# Patient Record
Sex: FEMALE | Race: OTHER | NOT HISPANIC OR LATINO | URBAN - METROPOLITAN AREA
[De-identification: names, ages, dates, MRNs, and addresses within clinical notes are randomized per-mention and may not be internally consistent; named-entity substitution may affect disease eponyms.]

---

## 2017-01-15 ENCOUNTER — EMERGENCY (EMERGENCY)
Facility: HOSPITAL | Age: 32
LOS: 1 days | Discharge: HOME | End: 2017-01-15
Attending: EMERGENCY MEDICINE | Admitting: EMERGENCY MEDICINE
Payer: COMMERCIAL

## 2017-01-15 VITALS
HEART RATE: 88 BPM | OXYGEN SATURATION: 96 % | DIASTOLIC BLOOD PRESSURE: 67 MMHG | RESPIRATION RATE: 18 BRPM | SYSTOLIC BLOOD PRESSURE: 107 MMHG | TEMPERATURE: 99 F

## 2017-01-15 VITALS
RESPIRATION RATE: 18 BRPM | TEMPERATURE: 98 F | DIASTOLIC BLOOD PRESSURE: 84 MMHG | OXYGEN SATURATION: 100 % | HEART RATE: 68 BPM | SYSTOLIC BLOOD PRESSURE: 126 MMHG

## 2017-01-15 DIAGNOSIS — N80.3 ENDOMETRIOSIS OF PELVIC PERITONEUM: Chronic | ICD-10-CM

## 2017-01-15 LAB
ALBUMIN SERPL ELPH-MCNC: 4.6 G/DL — SIGNIFICANT CHANGE UP (ref 3.3–5)
ALP SERPL-CCNC: 60 U/L — SIGNIFICANT CHANGE UP (ref 40–120)
ALT FLD-CCNC: 16 U/L RC — SIGNIFICANT CHANGE UP (ref 10–45)
ANION GAP SERPL CALC-SCNC: 15 MMOL/L — SIGNIFICANT CHANGE UP (ref 5–17)
APPEARANCE UR: ABNORMAL
AST SERPL-CCNC: 38 U/L — SIGNIFICANT CHANGE UP (ref 10–40)
BASOPHILS # BLD AUTO: 0 K/UL — SIGNIFICANT CHANGE UP (ref 0–0.2)
BASOPHILS NFR BLD AUTO: 0.8 % — SIGNIFICANT CHANGE UP (ref 0–2)
BILIRUB SERPL-MCNC: 1.5 MG/DL — HIGH (ref 0.2–1.2)
BILIRUB UR-MCNC: NEGATIVE — SIGNIFICANT CHANGE UP
BUN SERPL-MCNC: 8 MG/DL — SIGNIFICANT CHANGE UP (ref 7–23)
CALCIUM SERPL-MCNC: 9.4 MG/DL — SIGNIFICANT CHANGE UP (ref 8.4–10.5)
CHLORIDE SERPL-SCNC: 104 MMOL/L — SIGNIFICANT CHANGE UP (ref 96–108)
CO2 SERPL-SCNC: 26 MMOL/L — SIGNIFICANT CHANGE UP (ref 22–31)
COLOR SPEC: YELLOW — SIGNIFICANT CHANGE UP
CREAT SERPL-MCNC: 0.7 MG/DL — SIGNIFICANT CHANGE UP (ref 0.5–1.3)
DIFF PNL FLD: ABNORMAL
EOSINOPHIL # BLD AUTO: 0.1 K/UL — SIGNIFICANT CHANGE UP (ref 0–0.5)
EOSINOPHIL NFR BLD AUTO: 1.5 % — SIGNIFICANT CHANGE UP (ref 0–6)
EPI CELLS # UR: SIGNIFICANT CHANGE UP /HPF
GLUCOSE SERPL-MCNC: 95 MG/DL — SIGNIFICANT CHANGE UP (ref 70–99)
GLUCOSE UR QL: NEGATIVE — SIGNIFICANT CHANGE UP
HCG SERPL-ACNC: <2 MIU/ML — SIGNIFICANT CHANGE UP
HCT VFR BLD CALC: 43.8 % — SIGNIFICANT CHANGE UP (ref 34.5–45)
HGB BLD-MCNC: 14.7 G/DL — SIGNIFICANT CHANGE UP (ref 11.5–15.5)
KETONES UR-MCNC: NEGATIVE — SIGNIFICANT CHANGE UP
LEUKOCYTE ESTERASE UR-ACNC: NEGATIVE — SIGNIFICANT CHANGE UP
LYMPHOCYTES # BLD AUTO: 3.4 K/UL — HIGH (ref 1–3.3)
LYMPHOCYTES # BLD AUTO: 56.3 % — HIGH (ref 13–44)
MCHC RBC-ENTMCNC: 32.5 PG — SIGNIFICANT CHANGE UP (ref 27–34)
MCHC RBC-ENTMCNC: 33.5 GM/DL — SIGNIFICANT CHANGE UP (ref 32–36)
MCV RBC AUTO: 96.9 FL — SIGNIFICANT CHANGE UP (ref 80–100)
MONOCYTES # BLD AUTO: 0.6 K/UL — SIGNIFICANT CHANGE UP (ref 0–0.9)
MONOCYTES NFR BLD AUTO: 10 % — SIGNIFICANT CHANGE UP (ref 2–14)
NEUTROPHILS # BLD AUTO: 1.9 K/UL — SIGNIFICANT CHANGE UP (ref 1.8–7.4)
NEUTROPHILS NFR BLD AUTO: 31.3 % — LOW (ref 43–77)
NITRITE UR-MCNC: NEGATIVE — SIGNIFICANT CHANGE UP
PH UR: 7 — SIGNIFICANT CHANGE UP (ref 4.8–8)
PLATELET # BLD AUTO: 278 K/UL — SIGNIFICANT CHANGE UP (ref 150–400)
POTASSIUM SERPL-MCNC: 4.3 MMOL/L — SIGNIFICANT CHANGE UP (ref 3.5–5.3)
POTASSIUM SERPL-SCNC: 4.3 MMOL/L — SIGNIFICANT CHANGE UP (ref 3.5–5.3)
PROT SERPL-MCNC: 8 G/DL — SIGNIFICANT CHANGE UP (ref 6–8.3)
PROT UR-MCNC: 30 MG/DL
RBC # BLD: 4.51 M/UL — SIGNIFICANT CHANGE UP (ref 3.8–5.2)
RBC # FLD: 12.3 % — SIGNIFICANT CHANGE UP (ref 10.3–14.5)
SODIUM SERPL-SCNC: 145 MMOL/L — SIGNIFICANT CHANGE UP (ref 135–145)
SP GR SPEC: 1.02 — SIGNIFICANT CHANGE UP (ref 1.01–1.02)
UROBILINOGEN FLD QL: NEGATIVE — SIGNIFICANT CHANGE UP
WBC # BLD: 6 K/UL — SIGNIFICANT CHANGE UP (ref 3.8–10.5)
WBC # FLD AUTO: 6 K/UL — SIGNIFICANT CHANGE UP (ref 3.8–10.5)
WBC UR QL: SIGNIFICANT CHANGE UP /HPF (ref 0–5)

## 2017-01-15 PROCEDURE — 76856 US EXAM PELVIC COMPLETE: CPT | Mod: 26,59

## 2017-01-15 PROCEDURE — 76830 TRANSVAGINAL US NON-OB: CPT | Mod: 26

## 2017-01-15 PROCEDURE — 99285 EMERGENCY DEPT VISIT HI MDM: CPT | Mod: 25

## 2017-01-15 PROCEDURE — 93975 VASCULAR STUDY: CPT | Mod: 26

## 2017-01-15 RX ORDER — METOCLOPRAMIDE HCL 10 MG
10 TABLET ORAL ONCE
Qty: 0 | Refills: 0 | Status: COMPLETED | OUTPATIENT
Start: 2017-01-15 | End: 2017-01-15

## 2017-01-15 RX ORDER — IBUPROFEN 200 MG
1 TABLET ORAL
Qty: 60 | Refills: 0 | OUTPATIENT
Start: 2017-01-15 | End: 2017-01-30

## 2017-01-15 RX ORDER — OXYCODONE HYDROCHLORIDE 5 MG/1
1 TABLET ORAL
Qty: 4 | Refills: 0 | OUTPATIENT
Start: 2017-01-15 | End: 2017-01-16

## 2017-01-15 RX ORDER — MORPHINE SULFATE 50 MG/1
4 CAPSULE, EXTENDED RELEASE ORAL ONCE
Qty: 0 | Refills: 0 | Status: DISCONTINUED | OUTPATIENT
Start: 2017-01-15 | End: 2017-01-15

## 2017-01-15 RX ORDER — DIPHENHYDRAMINE HCL 50 MG
50 CAPSULE ORAL ONCE
Qty: 0 | Refills: 0 | Status: COMPLETED | OUTPATIENT
Start: 2017-01-15 | End: 2017-01-15

## 2017-01-15 RX ORDER — IBUPROFEN 200 MG
1 TABLET ORAL
Qty: 28 | Refills: 0 | OUTPATIENT
Start: 2017-01-15 | End: 2017-01-22

## 2017-01-15 RX ORDER — ONDANSETRON 8 MG/1
4 TABLET, FILM COATED ORAL ONCE
Qty: 0 | Refills: 0 | Status: COMPLETED | OUTPATIENT
Start: 2017-01-15 | End: 2017-01-15

## 2017-01-15 RX ADMIN — Medication 10 MILLIGRAM(S): at 08:16

## 2017-01-15 RX ADMIN — Medication 50 MILLIGRAM(S): at 07:18

## 2017-01-15 RX ADMIN — MORPHINE SULFATE 4 MILLIGRAM(S): 50 CAPSULE, EXTENDED RELEASE ORAL at 08:45

## 2017-01-15 RX ADMIN — MORPHINE SULFATE 4 MILLIGRAM(S): 50 CAPSULE, EXTENDED RELEASE ORAL at 08:17

## 2017-01-15 NOTE — ED PROVIDER NOTE - GENITOURINARY EXTERNAL GENERAL. FEMALE
no adnexal tenderness or fullness. no cmt. no rashes/normal normal/no adnexal tenderness or fullness. no cmt. no rashes small blood in vault

## 2017-01-15 NOTE — ED PROVIDER NOTE - MEDICAL DECISION MAKING DETAILS
31f pmhx endometriosis s/p lap fulguration p/w abdo pain. suprapubic, started last night, gradual onset, now severe, constant, associated with nausea without vomiting. LMP 4 months ago, denies possibility of pregnancy. on PE, VSS, in moderate/severe distress, pain IMPROVED on palpation. will obtains basics, TVUS, UA, reassess

## 2017-01-15 NOTE — ED PROVIDER NOTE - CARE PLAN
Principal Discharge DX:	Abdominal pain Principal Discharge DX:	Abdominal pain  Instructions for follow-up, activity and diet:	1. return for worsening symptoms or anything concerning to you  2. take all home meds as prescribed  3. follow up with your pmd call to make an appointment  4. follow up with your obgyn call to make an appointment  5. Take motrin 600mg PO Q6 hours prn pain  6. Take oxycodone 5 mg every 6 hours as needed for pain; do not drink alcohol while taking this medication.

## 2017-01-15 NOTE — ED PROVIDER NOTE - PLAN OF CARE
1. return for worsening symptoms or anything concerning to you  2. take all home meds as prescribed  3. follow up with your pmd call to make an appointment  4. follow up with your obgyn call to make an appointment  5. Take motrin 600mg PO Q6 hours prn pain  6. Take oxycodone 5 mg every 6 hours as needed for pain; do not drink alcohol while taking this medication.

## 2017-01-15 NOTE — ED PROVIDER NOTE - OBJECTIVE STATEMENT
31F hx endometriosis presents with severe sudden onset pelvic pain. Pain woke her up from sleep. severe suprapubic/pelvic non-radiating. associated with nausea no vomiting. no vaginal discharge or bleeding. LMP 3 months ago - pt on depot provera. hasn't taken anything for the pain. no dysuria

## 2017-01-15 NOTE — ED ADULT NURSE NOTE - OBJECTIVE STATEMENT
32 y/o female BIBA for multiple episodes of N/V for past two hours. Pt c/o lower mid gastric ABD pain. Denies diarrhea, fever, chills, SOB, chest pain. A&Ox4, VSS, skin warm dry and intact, lungs CTA, MAEx4. Awaits further evaluation from MD.

## 2017-01-18 DIAGNOSIS — Z88.8 ALLERGY STATUS TO OTHER DRUGS, MEDICAMENTS AND BIOLOGICAL SUBSTANCES STATUS: ICD-10-CM

## 2017-01-18 DIAGNOSIS — R10.9 UNSPECIFIED ABDOMINAL PAIN: ICD-10-CM

## 2017-04-13 PROCEDURE — 99284 EMERGENCY DEPT VISIT MOD MDM: CPT | Mod: 25

## 2017-04-13 PROCEDURE — 84702 CHORIONIC GONADOTROPIN TEST: CPT

## 2017-04-13 PROCEDURE — 76856 US EXAM PELVIC COMPLETE: CPT

## 2017-04-13 PROCEDURE — 96374 THER/PROPH/DIAG INJ IV PUSH: CPT

## 2017-04-13 PROCEDURE — 93975 VASCULAR STUDY: CPT

## 2017-04-13 PROCEDURE — 80053 COMPREHEN METABOLIC PANEL: CPT

## 2017-04-13 PROCEDURE — 76830 TRANSVAGINAL US NON-OB: CPT

## 2017-04-13 PROCEDURE — 96375 TX/PRO/DX INJ NEW DRUG ADDON: CPT

## 2017-04-13 PROCEDURE — 81001 URINALYSIS AUTO W/SCOPE: CPT

## 2017-04-13 PROCEDURE — 85027 COMPLETE CBC AUTOMATED: CPT

## 2018-07-23 ENCOUNTER — EMERGENCY (EMERGENCY)
Facility: HOSPITAL | Age: 33
LOS: 1 days | Discharge: HOME | End: 2018-07-23
Attending: EMERGENCY MEDICINE
Payer: COMMERCIAL

## 2018-07-23 VITALS
HEIGHT: 65 IN | OXYGEN SATURATION: 97 % | RESPIRATION RATE: 20 BRPM | DIASTOLIC BLOOD PRESSURE: 87 MMHG | SYSTOLIC BLOOD PRESSURE: 133 MMHG | HEART RATE: 90 BPM | WEIGHT: 160.06 LBS | TEMPERATURE: 98 F

## 2018-07-23 DIAGNOSIS — N80.3 ENDOMETRIOSIS OF PELVIC PERITONEUM: Chronic | ICD-10-CM

## 2018-07-23 LAB
ALBUMIN SERPL ELPH-MCNC: 4.1 G/DL — SIGNIFICANT CHANGE UP (ref 3.5–5)
ALP SERPL-CCNC: 73 U/L — SIGNIFICANT CHANGE UP (ref 40–120)
ALT FLD-CCNC: 17 U/L DA — SIGNIFICANT CHANGE UP (ref 10–60)
ANION GAP SERPL CALC-SCNC: 7 MMOL/L — SIGNIFICANT CHANGE UP (ref 5–17)
APPEARANCE UR: SIGNIFICANT CHANGE UP
AST SERPL-CCNC: 18 U/L — SIGNIFICANT CHANGE UP (ref 10–40)
BASOPHILS # BLD AUTO: 0.1 K/UL — SIGNIFICANT CHANGE UP (ref 0–0.2)
BASOPHILS NFR BLD AUTO: 2.1 % — HIGH (ref 0–2)
BILIRUB SERPL-MCNC: 0.9 MG/DL — SIGNIFICANT CHANGE UP (ref 0.2–1.2)
BILIRUB UR-MCNC: NEGATIVE — SIGNIFICANT CHANGE UP
BUN SERPL-MCNC: 7 MG/DL — SIGNIFICANT CHANGE UP (ref 7–18)
CALCIUM SERPL-MCNC: 8.6 MG/DL — SIGNIFICANT CHANGE UP (ref 8.4–10.5)
CHLORIDE SERPL-SCNC: 109 MMOL/L — HIGH (ref 96–108)
CO2 SERPL-SCNC: 23 MMOL/L — SIGNIFICANT CHANGE UP (ref 22–31)
COLOR SPEC: YELLOW — SIGNIFICANT CHANGE UP
CREAT SERPL-MCNC: 0.8 MG/DL — SIGNIFICANT CHANGE UP (ref 0.5–1.3)
DIFF PNL FLD: ABNORMAL
EOSINOPHIL # BLD AUTO: 0.1 K/UL — SIGNIFICANT CHANGE UP (ref 0–0.5)
EOSINOPHIL NFR BLD AUTO: 1.8 % — SIGNIFICANT CHANGE UP (ref 0–6)
GLUCOSE SERPL-MCNC: 82 MG/DL — SIGNIFICANT CHANGE UP (ref 70–99)
GLUCOSE UR QL: NEGATIVE — SIGNIFICANT CHANGE UP
HCG SERPL-ACNC: <1 MIU/ML — SIGNIFICANT CHANGE UP
HCG UR QL: NEGATIVE — SIGNIFICANT CHANGE UP
HCT VFR BLD CALC: 41.4 % — SIGNIFICANT CHANGE UP (ref 34.5–45)
HGB BLD-MCNC: 13.7 G/DL — SIGNIFICANT CHANGE UP (ref 11.5–15.5)
KETONES UR-MCNC: ABNORMAL
LEUKOCYTE ESTERASE UR-ACNC: ABNORMAL
LIDOCAIN IGE QN: 88 U/L — SIGNIFICANT CHANGE UP (ref 73–393)
LYMPHOCYTES # BLD AUTO: 1.8 K/UL — SIGNIFICANT CHANGE UP (ref 1–3.3)
LYMPHOCYTES # BLD AUTO: 42.3 % — SIGNIFICANT CHANGE UP (ref 13–44)
MCHC RBC-ENTMCNC: 31.6 PG — SIGNIFICANT CHANGE UP (ref 27–34)
MCHC RBC-ENTMCNC: 33.2 GM/DL — SIGNIFICANT CHANGE UP (ref 32–36)
MCV RBC AUTO: 95.1 FL — SIGNIFICANT CHANGE UP (ref 80–100)
MONOCYTES # BLD AUTO: 0.3 K/UL — SIGNIFICANT CHANGE UP (ref 0–0.9)
MONOCYTES NFR BLD AUTO: 8 % — SIGNIFICANT CHANGE UP (ref 2–14)
NEUTROPHILS # BLD AUTO: 2 K/UL — SIGNIFICANT CHANGE UP (ref 1.8–7.4)
NEUTROPHILS NFR BLD AUTO: 45.8 % — SIGNIFICANT CHANGE UP (ref 43–77)
NITRITE UR-MCNC: NEGATIVE — SIGNIFICANT CHANGE UP
PH UR: 6 — SIGNIFICANT CHANGE UP (ref 5–8)
PLATELET # BLD AUTO: 332 K/UL — SIGNIFICANT CHANGE UP (ref 150–400)
POTASSIUM SERPL-MCNC: 3.9 MMOL/L — SIGNIFICANT CHANGE UP (ref 3.5–5.3)
POTASSIUM SERPL-SCNC: 3.9 MMOL/L — SIGNIFICANT CHANGE UP (ref 3.5–5.3)
PROT SERPL-MCNC: 8 G/DL — SIGNIFICANT CHANGE UP (ref 6–8.3)
PROT UR-MCNC: 30 MG/DL
RBC # BLD: 4.36 M/UL — SIGNIFICANT CHANGE UP (ref 3.8–5.2)
RBC # FLD: 12.4 % — SIGNIFICANT CHANGE UP (ref 10.3–14.5)
SODIUM SERPL-SCNC: 139 MMOL/L — SIGNIFICANT CHANGE UP (ref 135–145)
SP GR SPEC: 1.01 — SIGNIFICANT CHANGE UP (ref 1.01–1.02)
UROBILINOGEN FLD QL: NEGATIVE — SIGNIFICANT CHANGE UP
WBC # BLD: 4.4 K/UL — SIGNIFICANT CHANGE UP (ref 3.8–10.5)
WBC # FLD AUTO: 4.4 K/UL — SIGNIFICANT CHANGE UP (ref 3.8–10.5)

## 2018-07-23 PROCEDURE — 99285 EMERGENCY DEPT VISIT HI MDM: CPT

## 2018-07-23 PROCEDURE — 76830 TRANSVAGINAL US NON-OB: CPT | Mod: 26

## 2018-07-23 PROCEDURE — 76856 US EXAM PELVIC COMPLETE: CPT | Mod: 26

## 2018-07-23 PROCEDURE — 74177 CT ABD & PELVIS W/CONTRAST: CPT | Mod: 26

## 2018-07-23 RX ORDER — MORPHINE SULFATE 50 MG/1
4 CAPSULE, EXTENDED RELEASE ORAL ONCE
Qty: 0 | Refills: 0 | Status: DISCONTINUED | OUTPATIENT
Start: 2018-07-23 | End: 2018-07-23

## 2018-07-23 RX ORDER — SODIUM CHLORIDE 9 MG/ML
1000 INJECTION INTRAMUSCULAR; INTRAVENOUS; SUBCUTANEOUS ONCE
Qty: 0 | Refills: 0 | Status: COMPLETED | OUTPATIENT
Start: 2018-07-23 | End: 2018-07-23

## 2018-07-23 RX ORDER — HYDROMORPHONE HYDROCHLORIDE 2 MG/ML
1 INJECTION INTRAMUSCULAR; INTRAVENOUS; SUBCUTANEOUS ONCE
Qty: 0 | Refills: 0 | Status: DISCONTINUED | OUTPATIENT
Start: 2018-07-23 | End: 2018-07-23

## 2018-07-23 RX ORDER — METOCLOPRAMIDE HCL 10 MG
10 TABLET ORAL ONCE
Qty: 0 | Refills: 0 | Status: COMPLETED | OUTPATIENT
Start: 2018-07-23 | End: 2018-07-23

## 2018-07-23 RX ORDER — HYDROMORPHONE HYDROCHLORIDE 2 MG/ML
0.5 INJECTION INTRAMUSCULAR; INTRAVENOUS; SUBCUTANEOUS ONCE
Qty: 0 | Refills: 0 | Status: DISCONTINUED | OUTPATIENT
Start: 2018-07-23 | End: 2018-07-23

## 2018-07-23 RX ADMIN — SODIUM CHLORIDE 1000 MILLILITER(S): 9 INJECTION INTRAMUSCULAR; INTRAVENOUS; SUBCUTANEOUS at 20:42

## 2018-07-23 RX ADMIN — MORPHINE SULFATE 4 MILLIGRAM(S): 50 CAPSULE, EXTENDED RELEASE ORAL at 20:42

## 2018-07-23 RX ADMIN — HYDROMORPHONE HYDROCHLORIDE 1 MILLIGRAM(S): 2 INJECTION INTRAMUSCULAR; INTRAVENOUS; SUBCUTANEOUS at 21:36

## 2018-07-23 RX ADMIN — HYDROMORPHONE HYDROCHLORIDE 0.5 MILLIGRAM(S): 2 INJECTION INTRAMUSCULAR; INTRAVENOUS; SUBCUTANEOUS at 22:29

## 2018-07-23 RX ADMIN — MORPHINE SULFATE 4 MILLIGRAM(S): 50 CAPSULE, EXTENDED RELEASE ORAL at 21:40

## 2018-07-23 RX ADMIN — Medication 10 MILLIGRAM(S): at 20:42

## 2018-07-23 RX ADMIN — HYDROMORPHONE HYDROCHLORIDE 1 MILLIGRAM(S): 2 INJECTION INTRAMUSCULAR; INTRAVENOUS; SUBCUTANEOUS at 22:30

## 2018-07-23 NOTE — ED PROVIDER NOTE - OBJECTIVE STATEMENT
34 y/o F pt w/ PMHx of endometrial cyst presents to ED c/o abdominal pain, vaginal bleeding, nausea, vomiting x 2 days. Pt denies diarrhea, urinary symptoms, and all other complaints. Pt reports LMP ended 10 days ago. NKDA. 34 y/o F pt w/ PMHx of endometriosis presents to ED c/o abdominal pain, vaginal bleeding, nausea, vomiting x 2 days. Pt denies diarrhea, urinary symptoms, and all other complaints. Pt reports LMP ended 10 days ago. NKDA.

## 2018-07-23 NOTE — ED PROVIDER NOTE - PROGRESS NOTE DETAILS
Pt still uncomfortable.  Says to uncomfortable to go to ultrasound.  Will give dose of dilaudid and reassess. Patient signed out to me by Dr. Forman. c/o severe abdominal pain. Feels moderately better now. Awaiting results of US and CT abdomen/pelvis. If negative, may d/c. US and CT normal. Patient still c/o severe pain. Will call gyn to evaluate patient. Patient seen by gyn TRIPP Cenetno, who elicited additional information. Patient sees a pain management specialist, who does spinal blocks and prescribes percocet 30mg and gabapentin. Patient has also been having vomiting and diarrhea for 3days, non-bloody, non-bilious. No fever, cp, sob, dysuria, urgency, frequency. Patient has not been taking her pain medicine and has not gotten her Lupron shots in 1 year. Will refer patient back to her pain specialist and give information to cc to arrange f/u with GI. Patient also instructed to f/u with gyn in 1-2 days for reeval to and restart Lupron. Patient seen by gyn TRIPP Centeno, who elicited additional information. Patient sees a pain management specialist, who does spinal blocks and prescribes percocet #30/month and gabapentin. Patient has also been having vomiting and diarrhea for 3days, non-bloody, non-bilious. No fever, cp, sob, dysuria, urgency, frequency. Patient has not been taking her pain medicine and has not gotten her Lupron shots in 1 year. Will refer patient back to her pain specialist and give information to cc to arrange f/u with GI. Patient also instructed to f/u with gyn in 1-2 days for reeval to and restart Lupron. Patient reports she ran out of percocet. Patient reports she always runs out early. I warned patient that if I prescribe her percocet, her pain management doctor may dismiss her from the practice. Patient still wants rx. Patient resting comfortably, feels moderately improved. Drank gatorade. No vomiting. Patient discharged home in company of friend.

## 2018-07-23 NOTE — ED PROVIDER NOTE - CARE PLAN
Principal Discharge DX:	Generalized abdominal pain  Secondary Diagnosis:	Non-intractable vomiting with nausea, unspecified vomiting type  Secondary Diagnosis:	Diarrhea, unspecified type

## 2018-07-23 NOTE — ED PROVIDER NOTE - MEDICAL DECISION MAKING DETAILS
32 y/o F pt history of endometritis, presents diffuse lower abdomen pain, pain different from endometritis, will conduct labs, US, CAT scan, analgesia, reasses.

## 2018-07-24 VITALS
OXYGEN SATURATION: 100 % | RESPIRATION RATE: 16 BRPM | TEMPERATURE: 99 F | HEART RATE: 81 BPM | DIASTOLIC BLOOD PRESSURE: 76 MMHG | SYSTOLIC BLOOD PRESSURE: 130 MMHG

## 2018-07-24 PROCEDURE — 96374 THER/PROPH/DIAG INJ IV PUSH: CPT

## 2018-07-24 PROCEDURE — 76856 US EXAM PELVIC COMPLETE: CPT

## 2018-07-24 PROCEDURE — 81001 URINALYSIS AUTO W/SCOPE: CPT

## 2018-07-24 PROCEDURE — 85027 COMPLETE CBC AUTOMATED: CPT

## 2018-07-24 PROCEDURE — 80053 COMPREHEN METABOLIC PANEL: CPT

## 2018-07-24 PROCEDURE — 76830 TRANSVAGINAL US NON-OB: CPT

## 2018-07-24 PROCEDURE — 96376 TX/PRO/DX INJ SAME DRUG ADON: CPT

## 2018-07-24 PROCEDURE — 81025 URINE PREGNANCY TEST: CPT

## 2018-07-24 PROCEDURE — 74177 CT ABD & PELVIS W/CONTRAST: CPT

## 2018-07-24 PROCEDURE — 83690 ASSAY OF LIPASE: CPT

## 2018-07-24 PROCEDURE — 99284 EMERGENCY DEPT VISIT MOD MDM: CPT | Mod: 25

## 2018-07-24 PROCEDURE — 84702 CHORIONIC GONADOTROPIN TEST: CPT

## 2018-07-24 PROCEDURE — 96375 TX/PRO/DX INJ NEW DRUG ADDON: CPT

## 2018-07-24 RX ORDER — OXYCODONE HYDROCHLORIDE 5 MG/1
1 TABLET ORAL
Qty: 8 | Refills: 0 | OUTPATIENT
Start: 2018-07-24

## 2018-07-24 RX ORDER — ONDANSETRON 8 MG/1
1 TABLET, FILM COATED ORAL
Qty: 9 | Refills: 0 | OUTPATIENT
Start: 2018-07-24 | End: 2018-07-26

## 2018-07-24 RX ADMIN — HYDROMORPHONE HYDROCHLORIDE 1 MILLIGRAM(S): 2 INJECTION INTRAMUSCULAR; INTRAVENOUS; SUBCUTANEOUS at 01:12

## 2018-07-24 RX ADMIN — HYDROMORPHONE HYDROCHLORIDE 0.5 MILLIGRAM(S): 2 INJECTION INTRAMUSCULAR; INTRAVENOUS; SUBCUTANEOUS at 00:45

## 2018-07-24 RX ADMIN — HYDROMORPHONE HYDROCHLORIDE 1 MILLIGRAM(S): 2 INJECTION INTRAMUSCULAR; INTRAVENOUS; SUBCUTANEOUS at 00:48

## 2018-07-24 NOTE — CONSULT NOTE ADULT - SUBJECTIVE AND OBJECTIVE BOX
Patient is a 33 year old G0 who presented to the ER with complaint of nausea, vomiting, diarrhea and sharp pulling upper abdominal pain since Saturday.  Reports a long history of endometriosis for which she follows Dr Hamm and was given lupron.  Patient states that she has not taken lupron for over a year.  Patient also is followed by a pain management specialist who gives her gabapentin, oxycodone and nerve blocks monthly.  States this pain is different from her usual endometriosis pain, as it is higher in her abdomen and different in nature.  Has not taken anything for pain at home.  Denies fevers, chills, heavy vaginal bleeding, recent intercourse, or any other concerns.     PMHx: Denies  Sxhx: 6 laparoscopic procedures for endometriosis, left oophorectomy, appendectomy, bilateral hernia repair  Meds: None currently  Allergies: Zofran - anxiety/palpitations  GynHx: Known endometriosis, no fibroids, no cysts, no stds, normal paps  OBHx: Nulliparous    Labs:                         13.7   4.4   )-----------( 332      ( 23 Jul 2018 20:40 )             41.4   07-23    139  |  109<H>  |  7   ----------------------------<  82  3.9   |  23  |  0.80    Ca    8.6      23 Jul 2018 20:40    TPro  8.0  /  Alb  4.1  /  TBili  0.9  /  DBili  x   /  AST  18  /  ALT  17  /  AlkPhos  73  07-23    Sono:   < from: US Transvaginal (07.23.18 @ 22:07) >  FINDINGS:  Uterus: Unremarkable. Endometrial stripe is normal for age.    Ovaries:   The right ovary measures 2.4 x 1.4 x 1.9 cm. Normal vascularity is   present. No adnexal masses.    The left ovary measures 2.9 x 1.7 x 1.0 cm. Normal vascularity is   present. No adnexal masses.    There is no free fluid.    IMPRESSION:  Unremarkable pelvic sonogram.    CT Abdomen/Pelvis:  < from: CT Abdomen and Pelvis w/ IV Cont (07.23.18 @ 22:36) >  FINDINGS:  Lung bases: Clear.    Organs: The liver, gallbladder, spleen, pancreas, kidneys and adrenal   glands are unremarkable.    Gastrointestinal tract: There is no evidence of a bowel obstruction or   inflammation. The patient is status post an appendectomy.    Vascular: There is no abdominal aortic aneurysm. A retroaortic left renal   vein is incidentally noted.    Pelvis:The urinary bladder, uterus and right ovary are unremarkable. The   left ovary is not visualized. No left adnexal mass is present.    Miscellaneous: There is no significant abdominal or pelvic   lymphadenopathy. No free air or free fluid is demonstrated.    Bones: The visualized osseous structures are unremarkable.    IMPRESSION:  No bowel obstruction or inflammation. Status post appendectomy.    < end of copied text >    A/P: 33 year old female with known history of endometriosis.  Today with different type of abdominal pain, likely unrelated to endometriosis.      -Supportive management for gastritis symptoms  -Pain management   -Follow up outpatient with gyn and pain management specialist  -No immediate gyn intervention needed    Discussed with Dr Diaz

## 2018-12-14 ENCOUNTER — INPATIENT (INPATIENT)
Facility: HOSPITAL | Age: 33
LOS: 3 days | Discharge: HOME | End: 2018-12-18
Attending: INTERNAL MEDICINE | Admitting: INTERNAL MEDICINE
Payer: MEDICAID

## 2018-12-14 VITALS
HEART RATE: 78 BPM | SYSTOLIC BLOOD PRESSURE: 128 MMHG | RESPIRATION RATE: 18 BRPM | WEIGHT: 160.06 LBS | DIASTOLIC BLOOD PRESSURE: 84 MMHG | TEMPERATURE: 98 F | HEIGHT: 65 IN | OXYGEN SATURATION: 96 %

## 2018-12-14 DIAGNOSIS — R07.89 OTHER CHEST PAIN: ICD-10-CM

## 2018-12-14 DIAGNOSIS — Z29.9 ENCOUNTER FOR PROPHYLACTIC MEASURES, UNSPECIFIED: ICD-10-CM

## 2018-12-14 DIAGNOSIS — J45.901 UNSPECIFIED ASTHMA WITH (ACUTE) EXACERBATION: ICD-10-CM

## 2018-12-14 DIAGNOSIS — N80.3 ENDOMETRIOSIS OF PELVIC PERITONEUM: Chronic | ICD-10-CM

## 2018-12-14 LAB
ALBUMIN SERPL ELPH-MCNC: 3.7 G/DL — SIGNIFICANT CHANGE UP (ref 3.3–5)
ALP SERPL-CCNC: 70 U/L — SIGNIFICANT CHANGE UP (ref 40–120)
ALT FLD-CCNC: 18 U/L — SIGNIFICANT CHANGE UP (ref 12–78)
ANION GAP SERPL CALC-SCNC: 7 MMOL/L — SIGNIFICANT CHANGE UP (ref 5–17)
AST SERPL-CCNC: 14 U/L — LOW (ref 15–37)
BASOPHILS # BLD AUTO: 0.03 K/UL — SIGNIFICANT CHANGE UP (ref 0–0.2)
BASOPHILS NFR BLD AUTO: 0.6 % — SIGNIFICANT CHANGE UP (ref 0–2)
BILIRUB SERPL-MCNC: 0.5 MG/DL — SIGNIFICANT CHANGE UP (ref 0.2–1.2)
BUN SERPL-MCNC: 11 MG/DL — SIGNIFICANT CHANGE UP (ref 7–23)
CALCIUM SERPL-MCNC: 8.6 MG/DL — SIGNIFICANT CHANGE UP (ref 8.5–10.1)
CHLORIDE SERPL-SCNC: 109 MMOL/L — HIGH (ref 96–108)
CK MB BLD-MCNC: <1.1 % — SIGNIFICANT CHANGE UP (ref 0–3.5)
CK MB CFR SERPL CALC: <1 NG/ML — SIGNIFICANT CHANGE UP (ref 0.5–3.6)
CK SERPL-CCNC: 92 U/L — SIGNIFICANT CHANGE UP (ref 26–192)
CO2 SERPL-SCNC: 25 MMOL/L — SIGNIFICANT CHANGE UP (ref 22–31)
CREAT SERPL-MCNC: 0.8 MG/DL — SIGNIFICANT CHANGE UP (ref 0.5–1.3)
D DIMER BLD IA.RAPID-MCNC: <150 NG/ML DDU — SIGNIFICANT CHANGE UP
EOSINOPHIL # BLD AUTO: 0.09 K/UL — SIGNIFICANT CHANGE UP (ref 0–0.5)
EOSINOPHIL NFR BLD AUTO: 1.7 % — SIGNIFICANT CHANGE UP (ref 0–6)
GLUCOSE SERPL-MCNC: 101 MG/DL — HIGH (ref 70–99)
HCG SERPL-ACNC: <1 — SIGNIFICANT CHANGE UP
HCT VFR BLD CALC: 40.6 % — SIGNIFICANT CHANGE UP (ref 34.5–45)
HGB BLD-MCNC: 13.3 G/DL — SIGNIFICANT CHANGE UP (ref 11.5–15.5)
IMM GRANULOCYTES NFR BLD AUTO: 0.4 % — SIGNIFICANT CHANGE UP (ref 0–1.5)
LYMPHOCYTES # BLD AUTO: 1.34 K/UL — SIGNIFICANT CHANGE UP (ref 1–3.3)
LYMPHOCYTES # BLD AUTO: 25.5 % — SIGNIFICANT CHANGE UP (ref 13–44)
MAGNESIUM SERPL-MCNC: 2.1 MG/DL — SIGNIFICANT CHANGE UP (ref 1.6–2.6)
MCHC RBC-ENTMCNC: 30.2 PG — SIGNIFICANT CHANGE UP (ref 27–34)
MCHC RBC-ENTMCNC: 32.8 GM/DL — SIGNIFICANT CHANGE UP (ref 32–36)
MCV RBC AUTO: 92.3 FL — SIGNIFICANT CHANGE UP (ref 80–100)
MONOCYTES # BLD AUTO: 0.5 K/UL — SIGNIFICANT CHANGE UP (ref 0–0.9)
MONOCYTES NFR BLD AUTO: 9.5 % — SIGNIFICANT CHANGE UP (ref 2–14)
NEUTROPHILS # BLD AUTO: 3.27 K/UL — SIGNIFICANT CHANGE UP (ref 1.8–7.4)
NEUTROPHILS NFR BLD AUTO: 62.3 % — SIGNIFICANT CHANGE UP (ref 43–77)
NRBC # BLD: 0 /100 WBCS — SIGNIFICANT CHANGE UP (ref 0–0)
PLATELET # BLD AUTO: 283 K/UL — SIGNIFICANT CHANGE UP (ref 150–400)
POTASSIUM SERPL-MCNC: 3.8 MMOL/L — SIGNIFICANT CHANGE UP (ref 3.5–5.3)
POTASSIUM SERPL-SCNC: 3.8 MMOL/L — SIGNIFICANT CHANGE UP (ref 3.5–5.3)
PROT SERPL-MCNC: 7.7 GM/DL — SIGNIFICANT CHANGE UP (ref 6–8.3)
RBC # BLD: 4.4 M/UL — SIGNIFICANT CHANGE UP (ref 3.8–5.2)
RBC # FLD: 12.7 % — SIGNIFICANT CHANGE UP (ref 10.3–14.5)
SODIUM SERPL-SCNC: 141 MMOL/L — SIGNIFICANT CHANGE UP (ref 135–145)
TROPONIN I SERPL-MCNC: <.015 NG/ML — SIGNIFICANT CHANGE UP (ref 0.01–0.04)
WBC # BLD: 5.25 K/UL — SIGNIFICANT CHANGE UP (ref 3.8–10.5)
WBC # FLD AUTO: 5.25 K/UL — SIGNIFICANT CHANGE UP (ref 3.8–10.5)

## 2018-12-14 PROCEDURE — 71045 X-RAY EXAM CHEST 1 VIEW: CPT | Mod: 26

## 2018-12-14 PROCEDURE — 99222 1ST HOSP IP/OBS MODERATE 55: CPT | Mod: AI

## 2018-12-14 PROCEDURE — 99285 EMERGENCY DEPT VISIT HI MDM: CPT

## 2018-12-14 PROCEDURE — 93010 ELECTROCARDIOGRAM REPORT: CPT

## 2018-12-14 RX ORDER — ACETAMINOPHEN 500 MG
975 TABLET ORAL ONCE
Qty: 0 | Refills: 0 | Status: COMPLETED | OUTPATIENT
Start: 2018-12-14 | End: 2018-12-14

## 2018-12-14 RX ORDER — METOCLOPRAMIDE HCL 10 MG
10 TABLET ORAL ONCE
Qty: 0 | Refills: 0 | Status: COMPLETED | OUTPATIENT
Start: 2018-12-14 | End: 2018-12-14

## 2018-12-14 RX ORDER — METOCLOPRAMIDE HCL 10 MG
10 TABLET ORAL ONCE
Qty: 0 | Refills: 0 | Status: DISCONTINUED | OUTPATIENT
Start: 2018-12-14 | End: 2018-12-14

## 2018-12-14 RX ORDER — ALBUTEROL 90 UG/1
2 AEROSOL, METERED ORAL EVERY 6 HOURS
Qty: 0 | Refills: 0 | Status: DISCONTINUED | OUTPATIENT
Start: 2018-12-14 | End: 2018-12-18

## 2018-12-14 RX ORDER — MORPHINE SULFATE 50 MG/1
4 CAPSULE, EXTENDED RELEASE ORAL ONCE
Qty: 0 | Refills: 0 | Status: DISCONTINUED | OUTPATIENT
Start: 2018-12-14 | End: 2018-12-14

## 2018-12-14 RX ORDER — INDOMETHACIN 50 MG
25 CAPSULE ORAL THREE TIMES A DAY
Qty: 0 | Refills: 0 | Status: DISCONTINUED | OUTPATIENT
Start: 2018-12-14 | End: 2018-12-16

## 2018-12-14 RX ORDER — ENOXAPARIN SODIUM 100 MG/ML
40 INJECTION SUBCUTANEOUS EVERY 24 HOURS
Qty: 0 | Refills: 0 | Status: DISCONTINUED | OUTPATIENT
Start: 2018-12-14 | End: 2018-12-14

## 2018-12-14 RX ORDER — SODIUM CHLORIDE 9 MG/ML
1000 INJECTION, SOLUTION INTRAVENOUS
Qty: 0 | Refills: 0 | Status: DISCONTINUED | OUTPATIENT
Start: 2018-12-14 | End: 2018-12-18

## 2018-12-14 RX ORDER — COLCHICINE 0.6 MG
0.6 TABLET ORAL THREE TIMES A DAY
Qty: 0 | Refills: 0 | Status: DISCONTINUED | OUTPATIENT
Start: 2018-12-14 | End: 2018-12-18

## 2018-12-14 RX ORDER — ZOLPIDEM TARTRATE 10 MG/1
5 TABLET ORAL AT BEDTIME
Qty: 0 | Refills: 0 | Status: DISCONTINUED | OUTPATIENT
Start: 2018-12-14 | End: 2018-12-18

## 2018-12-14 RX ORDER — PANTOPRAZOLE SODIUM 20 MG/1
40 TABLET, DELAYED RELEASE ORAL
Qty: 0 | Refills: 0 | Status: DISCONTINUED | OUTPATIENT
Start: 2018-12-14 | End: 2018-12-18

## 2018-12-14 RX ORDER — KETOROLAC TROMETHAMINE 30 MG/ML
30 SYRINGE (ML) INJECTION EVERY 6 HOURS
Qty: 0 | Refills: 0 | Status: DISCONTINUED | OUTPATIENT
Start: 2018-12-14 | End: 2018-12-16

## 2018-12-14 RX ORDER — ONDANSETRON 8 MG/1
4 TABLET, FILM COATED ORAL ONCE
Qty: 0 | Refills: 0 | Status: COMPLETED | OUTPATIENT
Start: 2018-12-14 | End: 2018-12-14

## 2018-12-14 RX ADMIN — ZOLPIDEM TARTRATE 5 MILLIGRAM(S): 10 TABLET ORAL at 23:18

## 2018-12-14 RX ADMIN — SODIUM CHLORIDE 50 MILLILITER(S): 9 INJECTION, SOLUTION INTRAVENOUS at 19:58

## 2018-12-14 RX ADMIN — MORPHINE SULFATE 4 MILLIGRAM(S): 50 CAPSULE, EXTENDED RELEASE ORAL at 17:14

## 2018-12-14 RX ADMIN — Medication 10 MILLIGRAM(S): at 19:58

## 2018-12-14 RX ADMIN — Medication 975 MILLIGRAM(S): at 15:51

## 2018-12-14 RX ADMIN — MORPHINE SULFATE 4 MILLIGRAM(S): 50 CAPSULE, EXTENDED RELEASE ORAL at 19:36

## 2018-12-14 RX ADMIN — Medication 30 MILLIGRAM(S): at 19:58

## 2018-12-14 RX ADMIN — Medication 975 MILLIGRAM(S): at 19:36

## 2018-12-14 RX ADMIN — ONDANSETRON 4 MILLIGRAM(S): 8 TABLET, FILM COATED ORAL at 16:56

## 2018-12-14 NOTE — H&P ADULT - NSHPREVIEWOFSYSTEMS_GEN_ALL_CORE
CONSTITUTIONAL: No fever, weight loss or fatigue  EYES: No eye pain, visual disturbances, or discharge  ENMT: Denies  difficulty hearing, tinnitis, vertigo, sinus or   throat pain   NECK: Denies pain or stiffness  BREAST: Denies pain, masses, or nipple discharge    RESP: Denies SOB, dyspnea, cough, wheezing, chills or hemoptysis   CV:  C/O chest pain/pressure. Denies  SOB, AGUILERA, palpitations, dizziness, lightheadedness or leg swelling  GI: Denies abdominal  or epigastric pain, nausea, vomiting, hematemesis; diarrhea or changes in bowel pattern, no melena or hematochezia.  : Denies dysuria, urgency, frequency, retention, hematuria or incontinence  SKIN: Denies itching, burning, rashes or lesions  MSK: Denies edema, pain, difficulty with ambulation, joint pain or swelling, muscle or back pain  NEURO: Denies weakness, numbness, tingling, loss of sensation, vision, headaches, memory loss  LYMPH: Denies pain or enlarged glands  ENDO: Denies heat or cold intolerances, hair loss  PSYCH: Denies depression, anxiety of SI  HEME: Denies easy brusing or bleeding gums  ALLERGY/IMMUNOLOGY: Denies hives, eczema

## 2018-12-14 NOTE — PHARMACY COMMUNICATION NOTE - COMMENTS
S/w Dr. Castellanos to confirm Colchicine frequency. Per MD he wants colchicine 0.6mg TID for Pericarditis

## 2018-12-14 NOTE — ED ADULT NURSE NOTE - OBJECTIVE STATEMENT
Receuved pt siutting on stretcher alert andoriented x4 c/o chestpain  started this morning denies any heavy lifting

## 2018-12-14 NOTE — H&P ADULT - NSHPPHYSICALEXAM_GEN_ALL_CORE
Vital Signs Last 24 Hrs  T(C): 36.6 (14 Dec 2018 19:14), Max: 36.7 (14 Dec 2018 15:05)  T(F): 97.8 (14 Dec 2018 19:14), Max: 98.1 (14 Dec 2018 15:05)  HR: 74 (14 Dec 2018 19:14) (74 - 78)  BP: 121/74 (14 Dec 2018 19:14) (121/74 - 128/84)  BP(mean): --  RR: 18 (14 Dec 2018 19:14) (18 - 18)  SpO2: 99% (14 Dec 2018 19:14) (96% - 99%)        GENERAL: NAD, well-groomed, well-developed  HEAD:  Atraumatic, Normocephalic  EYES: EOMI, PERRLA, conjunctiva and sclera clear  ENMT: No tonsillar erythema, exudates, or enlargement; Moist mucous membranes, Good dentition, No lesions  NECK: Supple, No JVD, Normal thyroid  NERVOUS SYSTEM:  Alert & Oriented X3, Good concentration; Motor Strength 5/5 B/L upper and lower extremities; DTRs 2+ intact and symmetric  CHEST/LUNG: Clear to percussion bilaterally; No rales, rhonchi, wheezing, or rubs  HEART: Regular rate and rhythm; No murmurs, rubs, or gallops  ABDOMEN: Soft, Nontender, Nondistended; Bowel sounds present  EXTREMITIES:  2+ Peripheral Pulses, No clubbing, cyanosis, or edema  LYMPH: No lymphadenopathy noted  SKIN: No rashes or lesions

## 2018-12-14 NOTE — H&P ADULT - ASSESSMENT
· 33 year old female PMH mild asthma (occasional albuterol use) presents with sharp atraumatic L sided chest p   pain/pressure worsening since last night. Tried Aleve at home at 1pm without improvement. Worse with inspiration. No change with exertion or rest. No hx of prior similar symptoms. Mother had MI at age 62 and .  Patient reported recent illness in the past 2 weeks of fever, nasal congestion and Flu like symptoms. Symptoms has resolved.  Denies cough or hemoptysis. No recent travel or leg swelling/calf pain. Not on OCPs. Nonsmoker. +2 episodes of vomiting earlier when pain was most intense. Patient admitted for chest pain and cardiac work up Patient admitted to Hospitalist Service and Dr Arteaga will assume care on 12/15/18 in AM.	    IMPROVE VTE Individual Risk Assessment          RISK                                                          Points    [  ] Previous VTE                                                3    [  ] Thrombophilia                                             2    [  ] Lower limb paralysis                                    2        (unable to hold up >15 seconds)      [  ] Current Cancer                                             2         (within 6 months)    [  ] Immobilization > 24 hrs                              1    [  ] ICU/CCU stay > 24 hours                            1    [  ] Age > 60                                                    1    IMPROVE VTE Score ________0_    Low risk 0-1: [  ] Early ambulation                          [  ] Intermittent Compression Device    High Risk 2-12: [  ] Heparin 5,000 units SC Q8 H                              [  ] Heparin 5,000 units SC Q 12 H                              [x  ] LMWH 40 mg SC daily (CrCl > 30 ml)

## 2018-12-14 NOTE — H&P ADULT - NSHPLABSRESULTS_GEN_ALL_CORE
13.3   5.25  )-----------( 283      ( 14 Dec 2018 16:06 )             40.6   12-14    141  |  109<H>  |  11  ----------------------------<  101<H>  3.8   |  25  |  0.80    Ca    8.6      14 Dec 2018 16:06  Mg     2.1     12-14    TPro  7.7  /  Alb  3.7  /  TBili  0.5  /  DBili  x   /  AST  14<L>  /  ALT  18  /  AlkPhos  70  12-14  CARDIAC MARKERS ( 14 Dec 2018 16:06 )  <.015 ng/mL / x     / x     / x     / x        < from: Xray Chest 1 View AP/PA. (12.14.18 @ 17:33) >    FINDINGS: Heart size appears within normal limits. No hilar or superior   mediastinal abnormalities are identified. There is no evidence for focal   infiltrate, lobar consolidation or pulmonary edema. No pleural effusion   orpneumothorax is demonstrated. No mediastinal shift is noted. The   visualized osseous structures appear unremarkable.    IMPRESSION: No evidence for focal infiltrate or lobar consolidatio    < end of copied text >

## 2018-12-14 NOTE — H&P ADULT - NSHPSOCIALHISTORY_GEN_ALL_CORE
Patient lives at home.  Denies use of tobacco and recreational drugs  Uses alcohol socially 2-3 times monthly

## 2018-12-14 NOTE — H&P ADULT - HISTORY OF PRESENT ILLNESS
· 33 year old female PMH mild asthma (occasional albuterol use) presents with sharp atraumatic L sided chest pain/pressure worsening since last night. Tried Aleve at home at 1pm without improvement. Worse with inspiration. No change with exertion or rest. No hx of prior similar symptoms. Mother had MI at age 62 and .  Patient reported recent illness in the past 2 weeks of fever, nasal congestion and Flu like symptoms. Symptoms has resolved.  Denies cough or hemoptysis. No recent travel or leg swelling/calf pain. Not on OCPs. Nonsmoker. +2 episodes of vomiting earlier when pain was most intense.

## 2018-12-14 NOTE — H&P ADULT - PROBLEM SELECTOR PLAN 1
Admit to telemetry   Repeat Stat Cardiac enzymes, previous CE negative  Cardiology consult pending, Dr Castellanos  TTE pending  Toradol PRN for pain

## 2018-12-14 NOTE — ED PROVIDER NOTE - MUSCULOSKELETAL, MLM
Spine appears normal, range of motion is not limited, no muscle or joint tenderness. No calf swelling/tenderness

## 2018-12-14 NOTE — H&P ADULT - ATTENDING COMMENTS
pt seen and examined w/np, pt w/atypical cp after viral illness, doubt acute myocarditis given nl troponin, likely costochondritis, sergey w/u further and cardio consult

## 2018-12-14 NOTE — ED PROVIDER NOTE - OBJECTIVE STATEMENT
33F PMH mild asthma (occasional albuterol use) p/w sharp atraumatic L sided chest pain/pressure worsening since last night. Tried Aleve at home at 1pm without improvement. Worse with inspiration. No change with exertion. No hx of prior similar symptoms. Mother had MI at age 62 and . No recent illness, fever/chills/cough or hemoptysis. No recent travel or leg swelling/calf pain. Not on OCPs. Nonsmoker. +2 episodes of vomiting earlier when pain was most intenswe.

## 2018-12-14 NOTE — ED PROVIDER NOTE - MEDICAL DECISION MAKING DETAILS
33F with chest pain and SOB, normal VS but appears markedly uncomfortable. Plan for labs including ddimer, ekg, cxr, possible cta, analgesia, cardiac monitor.

## 2018-12-14 NOTE — ED ADULT NURSE NOTE - NSFALLRSKPASTHIST_ED_ALL_ED
Please refer to attached ultrasound report for doctor's evaluation of the clinical information obtained by vital signs, ultrasound, and/or non-stress test along with management recommendation.
no

## 2018-12-14 NOTE — ED PROVIDER NOTE - ATTENDING CONTRIBUTION TO CARE
Agree with Dr. Shaw  In brief 33f hx asthma pw midsternal cp. on exam, looks uncomfortable. rrr, ctab. I read ekg as nsr rate 75, no st elevation or depression, normal qtc and pr, left atrial enlargement, axis normal. Agree with Dr. Shaw  In brief 33f hx asthma pw midsternal cp sp viral uri 1 week ago. on exam, looks uncomfortable. rrr, ctab. I read ekg as nsr rate 75, no st elevation or depression, normal qtc and pr, left atrial enlargement, axis normal. labs reassuring. Concern remains for pericarditis or myocarditis. Less likely valve rupture. admit for echo and cards eval.

## 2018-12-15 LAB — ERYTHROCYTE [SEDIMENTATION RATE] IN BLOOD: 7 MM/HR — SIGNIFICANT CHANGE UP (ref 0–15)

## 2018-12-15 PROCEDURE — 93306 TTE W/DOPPLER COMPLETE: CPT | Mod: 26

## 2018-12-15 RX ORDER — MORPHINE SULFATE 50 MG/1
2 CAPSULE, EXTENDED RELEASE ORAL ONCE
Qty: 0 | Refills: 0 | Status: DISCONTINUED | OUTPATIENT
Start: 2018-12-15 | End: 2018-12-15

## 2018-12-15 RX ADMIN — ZOLPIDEM TARTRATE 5 MILLIGRAM(S): 10 TABLET ORAL at 21:40

## 2018-12-15 RX ADMIN — MORPHINE SULFATE 2 MILLIGRAM(S): 50 CAPSULE, EXTENDED RELEASE ORAL at 09:40

## 2018-12-15 RX ADMIN — MORPHINE SULFATE 2 MILLIGRAM(S): 50 CAPSULE, EXTENDED RELEASE ORAL at 01:58

## 2018-12-15 RX ADMIN — Medication 0.6 MILLIGRAM(S): at 14:09

## 2018-12-15 RX ADMIN — Medication 0.6 MILLIGRAM(S): at 05:56

## 2018-12-15 RX ADMIN — Medication 25 MILLIGRAM(S): at 14:08

## 2018-12-15 RX ADMIN — Medication 25 MILLIGRAM(S): at 06:17

## 2018-12-15 RX ADMIN — Medication 25 MILLIGRAM(S): at 22:31

## 2018-12-15 RX ADMIN — MORPHINE SULFATE 2 MILLIGRAM(S): 50 CAPSULE, EXTENDED RELEASE ORAL at 02:28

## 2018-12-15 RX ADMIN — MORPHINE SULFATE 2 MILLIGRAM(S): 50 CAPSULE, EXTENDED RELEASE ORAL at 23:37

## 2018-12-15 RX ADMIN — Medication 0.6 MILLIGRAM(S): at 21:40

## 2018-12-15 RX ADMIN — Medication 25 MILLIGRAM(S): at 14:00

## 2018-12-15 RX ADMIN — PANTOPRAZOLE SODIUM 40 MILLIGRAM(S): 20 TABLET, DELAYED RELEASE ORAL at 06:04

## 2018-12-15 RX ADMIN — Medication 25 MILLIGRAM(S): at 05:56

## 2018-12-15 RX ADMIN — Medication 25 MILLIGRAM(S): at 21:40

## 2018-12-15 RX ADMIN — MORPHINE SULFATE 2 MILLIGRAM(S): 50 CAPSULE, EXTENDED RELEASE ORAL at 08:56

## 2018-12-16 LAB
ANION GAP SERPL CALC-SCNC: 9 MMOL/L — SIGNIFICANT CHANGE UP (ref 5–17)
APPEARANCE UR: ABNORMAL
BACTERIA # UR AUTO: ABNORMAL
BILIRUB UR-MCNC: NEGATIVE — SIGNIFICANT CHANGE UP
BUN SERPL-MCNC: 12 MG/DL — SIGNIFICANT CHANGE UP (ref 7–23)
CALCIUM SERPL-MCNC: 8.1 MG/DL — LOW (ref 8.5–10.1)
CHLORIDE SERPL-SCNC: 110 MMOL/L — HIGH (ref 96–108)
CO2 SERPL-SCNC: 22 MMOL/L — SIGNIFICANT CHANGE UP (ref 22–31)
COLOR SPEC: ABNORMAL
CREAT SERPL-MCNC: 0.63 MG/DL — SIGNIFICANT CHANGE UP (ref 0.5–1.3)
DIFF PNL FLD: ABNORMAL
EPI CELLS # UR: ABNORMAL
GLUCOSE SERPL-MCNC: 78 MG/DL — SIGNIFICANT CHANGE UP (ref 70–99)
GLUCOSE UR QL: NEGATIVE MG/DL — SIGNIFICANT CHANGE UP
HCT VFR BLD CALC: 37.8 % — SIGNIFICANT CHANGE UP (ref 34.5–45)
HGB BLD-MCNC: 13 G/DL — SIGNIFICANT CHANGE UP (ref 11.5–15.5)
KETONES UR-MCNC: NEGATIVE — SIGNIFICANT CHANGE UP
LEUKOCYTE ESTERASE UR-ACNC: ABNORMAL
MCHC RBC-ENTMCNC: 31.6 PG — SIGNIFICANT CHANGE UP (ref 27–34)
MCHC RBC-ENTMCNC: 34.4 GM/DL — SIGNIFICANT CHANGE UP (ref 32–36)
MCV RBC AUTO: 92 FL — SIGNIFICANT CHANGE UP (ref 80–100)
NITRITE UR-MCNC: NEGATIVE — SIGNIFICANT CHANGE UP
NRBC # BLD: 0 /100 WBCS — SIGNIFICANT CHANGE UP (ref 0–0)
PH UR: 5 — SIGNIFICANT CHANGE UP (ref 5–8)
PLATELET # BLD AUTO: 277 K/UL — SIGNIFICANT CHANGE UP (ref 150–400)
POTASSIUM SERPL-MCNC: 3.7 MMOL/L — SIGNIFICANT CHANGE UP (ref 3.5–5.3)
POTASSIUM SERPL-SCNC: 3.7 MMOL/L — SIGNIFICANT CHANGE UP (ref 3.5–5.3)
PROT UR-MCNC: 100 MG/DL
RBC # BLD: 4.11 M/UL — SIGNIFICANT CHANGE UP (ref 3.8–5.2)
RBC # FLD: 12.8 % — SIGNIFICANT CHANGE UP (ref 10.3–14.5)
RBC CASTS # UR COMP ASSIST: >50 /HPF (ref 0–4)
SODIUM SERPL-SCNC: 141 MMOL/L — SIGNIFICANT CHANGE UP (ref 135–145)
SP GR SPEC: 1.01 — SIGNIFICANT CHANGE UP (ref 1.01–1.02)
UROBILINOGEN FLD QL: NEGATIVE MG/DL — SIGNIFICANT CHANGE UP
WBC # BLD: 4.58 K/UL — SIGNIFICANT CHANGE UP (ref 3.8–10.5)
WBC # FLD AUTO: 4.58 K/UL — SIGNIFICANT CHANGE UP (ref 3.8–10.5)
WBC UR QL: ABNORMAL

## 2018-12-16 RX ORDER — MORPHINE SULFATE 50 MG/1
15 CAPSULE, EXTENDED RELEASE ORAL ONCE
Qty: 0 | Refills: 0 | Status: DISCONTINUED | OUTPATIENT
Start: 2018-12-16 | End: 2018-12-16

## 2018-12-16 RX ORDER — METOCLOPRAMIDE HCL 10 MG
10 TABLET ORAL EVERY 6 HOURS
Qty: 0 | Refills: 0 | Status: DISCONTINUED | OUTPATIENT
Start: 2018-12-16 | End: 2018-12-18

## 2018-12-16 RX ORDER — REGADENOSON 0.08 MG/ML
0.4 INJECTION, SOLUTION INTRAVENOUS ONCE
Qty: 0 | Refills: 0 | Status: COMPLETED | OUTPATIENT
Start: 2018-12-16 | End: 2018-12-17

## 2018-12-16 RX ORDER — INDOMETHACIN 50 MG
25 CAPSULE ORAL THREE TIMES A DAY
Qty: 0 | Refills: 0 | Status: DISCONTINUED | OUTPATIENT
Start: 2018-12-16 | End: 2018-12-18

## 2018-12-16 RX ADMIN — Medication 25 MILLIGRAM(S): at 05:47

## 2018-12-16 RX ADMIN — ZOLPIDEM TARTRATE 5 MILLIGRAM(S): 10 TABLET ORAL at 23:36

## 2018-12-16 RX ADMIN — Medication 25 MILLIGRAM(S): at 06:40

## 2018-12-16 RX ADMIN — PANTOPRAZOLE SODIUM 40 MILLIGRAM(S): 20 TABLET, DELAYED RELEASE ORAL at 05:48

## 2018-12-16 RX ADMIN — Medication 0.6 MILLIGRAM(S): at 05:47

## 2018-12-16 RX ADMIN — Medication 0.6 MILLIGRAM(S): at 22:20

## 2018-12-16 RX ADMIN — Medication 25 MILLIGRAM(S): at 14:01

## 2018-12-16 RX ADMIN — Medication 25 MILLIGRAM(S): at 15:00

## 2018-12-16 RX ADMIN — MORPHINE SULFATE 2 MILLIGRAM(S): 50 CAPSULE, EXTENDED RELEASE ORAL at 00:40

## 2018-12-16 RX ADMIN — MORPHINE SULFATE 15 MILLIGRAM(S): 50 CAPSULE, EXTENDED RELEASE ORAL at 08:15

## 2018-12-16 RX ADMIN — MORPHINE SULFATE 15 MILLIGRAM(S): 50 CAPSULE, EXTENDED RELEASE ORAL at 07:13

## 2018-12-16 RX ADMIN — Medication 25 MILLIGRAM(S): at 22:18

## 2018-12-16 RX ADMIN — Medication 0.6 MILLIGRAM(S): at 14:02

## 2018-12-16 RX ADMIN — Medication 25 MILLIGRAM(S): at 23:15

## 2018-12-16 RX ADMIN — Medication 10 MILLIGRAM(S): at 14:01

## 2018-12-17 LAB
ANION GAP SERPL CALC-SCNC: 8 MMOL/L — SIGNIFICANT CHANGE UP (ref 5–17)
BUN SERPL-MCNC: 12 MG/DL — SIGNIFICANT CHANGE UP (ref 7–23)
CALCIUM SERPL-MCNC: 7.8 MG/DL — LOW (ref 8.5–10.1)
CHLORIDE SERPL-SCNC: 110 MMOL/L — HIGH (ref 96–108)
CO2 SERPL-SCNC: 22 MMOL/L — SIGNIFICANT CHANGE UP (ref 22–31)
CREAT SERPL-MCNC: 0.57 MG/DL — SIGNIFICANT CHANGE UP (ref 0.5–1.3)
CULTURE RESULTS: SIGNIFICANT CHANGE UP
GLUCOSE SERPL-MCNC: 80 MG/DL — SIGNIFICANT CHANGE UP (ref 70–99)
POTASSIUM SERPL-MCNC: 3.8 MMOL/L — SIGNIFICANT CHANGE UP (ref 3.5–5.3)
POTASSIUM SERPL-SCNC: 3.8 MMOL/L — SIGNIFICANT CHANGE UP (ref 3.5–5.3)
SODIUM SERPL-SCNC: 140 MMOL/L — SIGNIFICANT CHANGE UP (ref 135–145)
SPECIMEN SOURCE: SIGNIFICANT CHANGE UP

## 2018-12-17 PROCEDURE — 78452 HT MUSCLE IMAGE SPECT MULT: CPT | Mod: 26

## 2018-12-17 RX ADMIN — Medication 0.6 MILLIGRAM(S): at 06:37

## 2018-12-17 RX ADMIN — ZOLPIDEM TARTRATE 5 MILLIGRAM(S): 10 TABLET ORAL at 23:28

## 2018-12-17 RX ADMIN — Medication 0.6 MILLIGRAM(S): at 16:13

## 2018-12-17 RX ADMIN — Medication 0.6 MILLIGRAM(S): at 22:26

## 2018-12-17 RX ADMIN — PANTOPRAZOLE SODIUM 40 MILLIGRAM(S): 20 TABLET, DELAYED RELEASE ORAL at 06:37

## 2018-12-17 RX ADMIN — Medication 25 MILLIGRAM(S): at 07:34

## 2018-12-17 RX ADMIN — Medication 25 MILLIGRAM(S): at 15:26

## 2018-12-17 RX ADMIN — Medication 25 MILLIGRAM(S): at 16:03

## 2018-12-17 RX ADMIN — Medication 25 MILLIGRAM(S): at 22:46

## 2018-12-17 RX ADMIN — Medication 25 MILLIGRAM(S): at 06:37

## 2018-12-17 RX ADMIN — Medication 25 MILLIGRAM(S): at 22:26

## 2018-12-17 RX ADMIN — REGADENOSON 0.4 MILLIGRAM(S): 0.08 INJECTION, SOLUTION INTRAVENOUS at 12:08

## 2018-12-17 NOTE — PROGRESS NOTE ADULT - ATTENDING COMMENTS
Cardiac noted;  ·  Problem: Other chest pain.  Plan: continue treatment for pericarditis.   Added Reglan  Indocin with meals  Continue current care and treatment.
Echo results pending.  Cardiac consult noted;  Problem: Other chest pain. Recommendation: risk stratification.  Continue Indomethacin    Continue current care and treatment.
NST  Cardiac following  Continue current care and treatment.

## 2018-12-17 NOTE — PROGRESS NOTE ADULT - PROBLEM SELECTOR PROBLEM 2
Mild asthma with acute exacerbation, unspecified whether persistent

## 2018-12-17 NOTE — PROGRESS NOTE ADULT - ASSESSMENT
· 33 year old female PMH mild asthma (occasional albuterol use) presents with sharp atraumatic L sided chest p   pain/pressure worsening since last night. Tried Aleve at home at 1pm without improvement. Worse with inspiration. No change with exertion or rest. No hx of prior similar symptoms. Mother had MI at age 62 and .  Patient reported recent illness in the past 2 weeks of fever, nasal congestion and Flu like symptoms. Symptoms has resolved.  Denies cough or hemoptysis. No recent travel or leg swelling/calf pain. Not on OCPs. Nonsmoker. +2 episodes of vomiting earlier when pain was most intense. Patient admitted for chest pain and cardiac work up Patient admitted to Hospitalist Service and Dr Arteaga will assume care on 12/15/18 in AM.	    Admitted for CP r/o Pericarditis

## 2018-12-18 VITALS
TEMPERATURE: 98 F | RESPIRATION RATE: 17 BRPM | HEART RATE: 63 BPM | OXYGEN SATURATION: 98 % | WEIGHT: 167.77 LBS | DIASTOLIC BLOOD PRESSURE: 61 MMHG | SYSTOLIC BLOOD PRESSURE: 98 MMHG

## 2018-12-18 LAB
ANION GAP SERPL CALC-SCNC: 7 MMOL/L — SIGNIFICANT CHANGE UP (ref 5–17)
BUN SERPL-MCNC: 13 MG/DL — SIGNIFICANT CHANGE UP (ref 7–23)
CALCIUM SERPL-MCNC: 8.1 MG/DL — LOW (ref 8.5–10.1)
CHLORIDE SERPL-SCNC: 109 MMOL/L — HIGH (ref 96–108)
CO2 SERPL-SCNC: 25 MMOL/L — SIGNIFICANT CHANGE UP (ref 22–31)
CREAT SERPL-MCNC: 0.57 MG/DL — SIGNIFICANT CHANGE UP (ref 0.5–1.3)
GLUCOSE SERPL-MCNC: 90 MG/DL — SIGNIFICANT CHANGE UP (ref 70–99)
HCT VFR BLD CALC: 37.1 % — SIGNIFICANT CHANGE UP (ref 34.5–45)
HGB BLD-MCNC: 12.5 G/DL — SIGNIFICANT CHANGE UP (ref 11.5–15.5)
MCHC RBC-ENTMCNC: 31 PG — SIGNIFICANT CHANGE UP (ref 27–34)
MCHC RBC-ENTMCNC: 33.7 GM/DL — SIGNIFICANT CHANGE UP (ref 32–36)
MCV RBC AUTO: 92.1 FL — SIGNIFICANT CHANGE UP (ref 80–100)
NRBC # BLD: 0 /100 WBCS — SIGNIFICANT CHANGE UP (ref 0–0)
PLATELET # BLD AUTO: 291 K/UL — SIGNIFICANT CHANGE UP (ref 150–400)
POTASSIUM SERPL-MCNC: 3.9 MMOL/L — SIGNIFICANT CHANGE UP (ref 3.5–5.3)
POTASSIUM SERPL-SCNC: 3.9 MMOL/L — SIGNIFICANT CHANGE UP (ref 3.5–5.3)
RBC # BLD: 4.03 M/UL — SIGNIFICANT CHANGE UP (ref 3.8–5.2)
RBC # FLD: 12.6 % — SIGNIFICANT CHANGE UP (ref 10.3–14.5)
SODIUM SERPL-SCNC: 141 MMOL/L — SIGNIFICANT CHANGE UP (ref 135–145)
WBC # BLD: 4.75 K/UL — SIGNIFICANT CHANGE UP (ref 3.8–10.5)
WBC # FLD AUTO: 4.75 K/UL — SIGNIFICANT CHANGE UP (ref 3.8–10.5)

## 2018-12-18 RX ORDER — ALBUTEROL 90 UG/1
2 AEROSOL, METERED ORAL
Qty: 1 | Refills: 0
Start: 2018-12-18 | End: 2019-01-16

## 2018-12-18 RX ORDER — PANTOPRAZOLE SODIUM 20 MG/1
1 TABLET, DELAYED RELEASE ORAL
Qty: 30 | Refills: 0
Start: 2018-12-18 | End: 2019-01-16

## 2018-12-18 RX ORDER — INDOMETHACIN 50 MG
1 CAPSULE ORAL
Qty: 21 | Refills: 0
Start: 2018-12-18 | End: 2018-12-24

## 2018-12-18 RX ORDER — CIPROFLOXACIN LACTATE 400MG/40ML
1 VIAL (ML) INTRAVENOUS
Qty: 20 | Refills: 0
Start: 2018-12-18 | End: 2018-12-27

## 2018-12-18 RX ADMIN — PANTOPRAZOLE SODIUM 40 MILLIGRAM(S): 20 TABLET, DELAYED RELEASE ORAL at 05:49

## 2018-12-18 RX ADMIN — Medication 0.6 MILLIGRAM(S): at 05:49

## 2018-12-18 RX ADMIN — Medication 25 MILLIGRAM(S): at 05:49

## 2018-12-18 NOTE — DISCHARGE NOTE ADULT - HOSPITAL COURSE
· 33 year old female PMH mild asthma (occasional albuterol use) presents with sharp atraumatic L sided chest pain/pressure worsening since last night. Tried Aleve at home at 1pm without improvement. Worse with inspiration. No change with exertion or rest. No hx of prior similar symptoms. Patient reported recent illness in the past 2 weeks of fever, nasal congestion and Flu like symptoms. Symptoms has resolved.  Denies cough or hemoptysis. No recent travel or leg swelling/calf pain. Not on OCPs. Nonsmoker. +2 episodes of vomiting earlier when pain was most intense.  Admitted for CP, seen by Cardiology, Pericarditis, echo unremarkable, NST without ischemia.  Improved with Indocin, found to have UTI treated with abx.  Patient remained stable and discharged home.

## 2018-12-18 NOTE — DISCHARGE NOTE ADULT - FINDINGS/TREATMENT
< from: NM Pharm Stress Test, Single Isotope (12.17.18 @ 14:35) >      IMPRESSION: Normal myocardial perfusion scan.    1. No scintigraphic evidence for myocardial infarct or ischemia.    2. There is normal left ventricular contractility, normal calculated   ejection fraction and normal myocardial thickening at rest. Overall post   stress ejection fraction was 70%     < end of copied text >

## 2018-12-18 NOTE — DISCHARGE NOTE ADULT - MEDICATION SUMMARY - MEDICATIONS TO TAKE
I will START or STAY ON the medications listed below when I get home from the hospital:    indomethacin 25 mg oral capsule  -- 1 cap(s) by mouth 3 times a day   WITH meals  -- Indication: For OTHER CHEST PAIN    albuterol 90 mcg/inh inhalation aerosol  -- 2 puff(s) inhaled every 6 hours, As needed, Shortness of Breath and/or Wheezing  -- Indication: For Mild asthma with acute exacerbation, unspecified whether persistent    pantoprazole 40 mg oral delayed release tablet  -- 1 tab(s) by mouth once a day (before a meal)  -- Indication: For Preventive measure    ciprofloxacin 500 mg oral tablet  -- 1 tab(s) by mouth every 12 hours   -- Avoid prolonged or excessive exposure to direct and/or artificial sunlight while taking this medication.  Check with your doctor before becoming pregnant.  Do not take dairy products, antacids, or iron preparations within one hour of this medication.  Finish all this medication unless otherwise directed by prescriber.  Medication should be taken with plenty of water.    -- Indication: For UTI

## 2018-12-18 NOTE — PROGRESS NOTE ADULT - PROBLEM SELECTOR PLAN 1
feeling better continue treatment for pericarditis.
medical management. Stress test negative for ischemia. Discharge plan as per PMD.
risk stratification for stress today.
risk stratification.
Admit to telemetry   Repeat Stat Cardiac enzymes, previous CE negative  Cardiology consult pending, Dr Castellanos  TTE noted.
Admit to telemetry   Repeat Stat Cardiac enzymes, previous CE negative  Cardiology consult pending, Dr Castellanos  TTE noted.
Admit to telemetry   Repeat Stat Cardiac enzymes, previous CE negative  Cardiology consult pending, Dr Castellanos  TTE pending  Toradol PRN for pain

## 2018-12-18 NOTE — PROGRESS NOTE ADULT - PROBLEM SELECTOR PROBLEM 1
Other chest pain

## 2018-12-18 NOTE — DISCHARGE NOTE ADULT - NS AS ACTIVITY OBS
Walking-Outdoors allowed/Stairs allowed/Bathing allowed/Return to Work/School allowed/Walking-Indoors allowed

## 2018-12-18 NOTE — DISCHARGE NOTE ADULT - OTHER SIGNIFICANT FINDINGS
LABS:                        12.5   4.75  )-----------( 291      ( 18 Dec 2018 06:35 )             37.1     12-18    141  |  109<H>  |  13  ----------------------------<  90  3.9   |  25  |  0.57    Ca    8.1<L>      18 Dec 2018 06:35        Urinalysis Basic - ( 16 Dec 2018 14:53 )    Color: Red / Appearance: very cloudy / S.015 / pH: x  Gluc: x / Ketone: Negative  / Bili: Negative / Urobili: Negative mg/dL   Blood: x / Protein: 100 mg/dL / Nitrite: Negative   Leuk Esterase: Trace / RBC: >50 /HPF / WBC 6-10   Sq Epi: x / Non Sq Epi: Many / Bacteria: Moderate      CAPILLARY BLOOD GLUCOSE

## 2018-12-18 NOTE — PROGRESS NOTE ADULT - SUBJECTIVE AND OBJECTIVE BOX
Patient is a 33y old  Female who presents with a chief complaint of Chest pain (15 Dec 2018 13:24)        PAST MEDICAL & SURGICAL HISTORY:  Asthma  Endometriosis  Endometriosis of pelvis      Summary of admission HPI:                PREVIOUS DIAGNOSTIC TESTING:      ECHO  FINDINGS:    STRESS  FINDINGS:    CATHETERIZATION  FINDINGS:    ELECTROPHYSIOLOGY STUDY  FINDINGS:    CAROTID ULTRASOUND:  FINDINGS    VENOUS DUPLEX SCAN:  FINDINGS:    CHEST CT PULMONARY ANGIO with IV Contrast:  FINDINGS:  MEDICATIONS  (STANDING):  colchicine 0.6 milliGRAM(s) Oral three times a day  dextrose 5% + sodium chloride 0.45%. 1000 milliLiter(s) (50 mL/Hr) IV Continuous <Continuous>  indomethacin 25 milliGRAM(s) Oral three times a day  pantoprazole    Tablet 40 milliGRAM(s) Oral before breakfast    MEDICATIONS  (PRN):  ALBUTerol    90 MICROgram(s) HFA Inhaler 2 Puff(s) Inhalation every 6 hours PRN Shortness of Breath and/or Wheezing  ketorolac   Injectable 30 milliGRAM(s) IV Push every 6 hours PRN Severe Pain (7 - 10)  zolpidem 5 milliGRAM(s) Oral at bedtime PRN Insomnia      FAMILY HISTORY:      SOCIAL HISTORY:    CIGARETTES:    ALCOHOL:    REVIEW OF SYSTEMS:    CONSTITUTIONAL: No fever, weight loss, chills, shakes, or fatigue  EYES: No eye pain, visual disturbances, or discharge  ENMT:  No difficulty hearing, tinnitus, vertigo; No sinus or throat pain  NECK: No pain or stiffness  BREASTS: No pain, masses, or nipple discharge  RESPIRATORY: No cough, wheezing, hemoptysis, or shortness of breath  CARDIOVASCULAR: No chest pain, dyspnea, palpitations, dizziness, syncope, paroxysmal nocturnal dyspnea, orthopnea, or arm or leg swelling  GASTROINTESTINAL: No abdominal  or epigastric pain, nausea, vomiting, hematemesis, diarrhea, constipation, melena or bright red blood.  GENITOURINARY: No dysuria, nocturia, hematuria, or urinary incontinence  NEUROLOGICAL: No headaches, memory loss, slurred speech, limb weakness, loss of strength, numbness, or tremors  SKIN: No itching, burning, rashes, or lesions   LYMPH NODES: No enlarged glands  ENDOCRINE: No heat or cold intolerance, or hair loss  MUSCULOSKELETAL: No joint pain or swelling, muscle, back, or extremity pain  PSYCHIATRIC: No depression, anxiety, or difficulty sleeping  HEME/LYMPH: No easy bruising or bleeding gums  ALLERY AND IMMUNOLOGIC: No hives or rash.      Vital Signs Last 24 Hrs  T(C): 37.3 (15 Dec 2018 17:00), Max: 37.3 (15 Dec 2018 17:00)  T(F): 99.1 (15 Dec 2018 17:00), Max: 99.1 (15 Dec 2018 17:00)  HR: 66 (15 Dec 2018 17:00) (66 - 74)  BP: 110/72 (15 Dec 2018 17:00) (99/59 - 129/75)  BP(mean): --  RR: 18 (15 Dec 2018 17:00) (16 - 18)  SpO2: 99% (15 Dec 2018 17:00) (97% - 99%)    PHYSICAL EXAM:    GENERAL: In no apparent distress, well nourished, and hydrated.  HEAD:  Atraumatic, Normocephalic  EYES: EOMI, PERRLA, conjunctiva and sclera clear  ENMT: No tonsillar erythema, exudates, or enlargement; Moist mucous membranes, Good dentition, No lesions  NECK: Supple and normal thyroid.  No JVD or carotid bruit.  Carotid pulse is 2+ bilaterally.  HEART: Regular rate and rhythm; No murmurs, rubs, or gallops.  PULMONARY: Clear to auscultation and perfusion.  No rales, wheezing, or rhonchi bilaterally.  ABDOMEN: Soft, Nontender, Nondistended; Bowel sounds present  EXTREMITIES:  2+ Peripheral Pulses, No clubbing, cyanosis, or edema  LYMPH: No lymphadenopathy noted  NEUROLOGICAL: Grossly nonfocal          INTERPRETATION OF TELEMETRY:    ECG:    I&O's Detail    15 Dec 2018 07:01  -  15 Dec 2018 18:26  --------------------------------------------------------  IN:    Oral Fluid: 360 mL  Total IN: 360 mL    OUT:  Total OUT: 0 mL    Total NET: 360 mL          LABS:                        13.3   5.25  )-----------( 283      ( 14 Dec 2018 16:06 )             40.6     12-14    141  |  109<H>  |  11  ----------------------------<  101<H>  3.8   |  25  |  0.80    Ca    8.6      14 Dec 2018 16:06  Mg     2.1     12-14    TPro  7.7  /  Alb  3.7  /  TBili  0.5  /  DBili  x   /  AST  14<L>  /  ALT  18  /  AlkPhos  70  12-14    CARDIAC MARKERS ( 14 Dec 2018 23:20 )  <.015 ng/mL / x     / x     / x     / x      CARDIAC MARKERS ( 14 Dec 2018 20:54 )  <.015 ng/mL / x     / 92 U/L / x     / <1.0 ng/mL  CARDIAC MARKERS ( 14 Dec 2018 16:06 )  <.015 ng/mL / x     / x     / x     / x              BNP  I&O's Detail    15 Dec 2018 07:01  -  15 Dec 2018 18:26  --------------------------------------------------------  IN:    Oral Fluid: 360 mL  Total IN: 360 mL    OUT:  Total OUT: 0 mL    Total NET: 360 mL        Daily     Daily Weight in k.1 (15 Dec 2018 05:28)    RADIOLOGY & ADDITIONAL STUDIES:
Patient is a 33y old  Female who presents with a chief complaint of Chest pain (16 Dec 2018 14:16)        PAST MEDICAL & SURGICAL HISTORY:  Asthma  Endometriosis  Endometriosis of pelvis      Summary of admission HPI:                PREVIOUS DIAGNOSTIC TESTING:      ECHO  FINDINGS:    STRESS  FINDINGS:    CATHETERIZATION  FINDINGS:    ELECTROPHYSIOLOGY STUDY  FINDINGS:    CAROTID ULTRASOUND:  FINDINGS    VENOUS DUPLEX SCAN:  FINDINGS:    CHEST CT PULMONARY ANGIO with IV Contrast:  FINDINGS:  MEDICATIONS  (STANDING):  colchicine 0.6 milliGRAM(s) Oral three times a day  dextrose 5% + sodium chloride 0.45%. 1000 milliLiter(s) (50 mL/Hr) IV Continuous <Continuous>  indomethacin 25 milliGRAM(s) Oral three times a day  metoclopramide Injectable 10 milliGRAM(s) IV Push every 6 hours  pantoprazole    Tablet 40 milliGRAM(s) Oral before breakfast  regadenoson Injectable 0.4 milliGRAM(s) IV Push once    MEDICATIONS  (PRN):  ALBUTerol    90 MICROgram(s) HFA Inhaler 2 Puff(s) Inhalation every 6 hours PRN Shortness of Breath and/or Wheezing  zolpidem 5 milliGRAM(s) Oral at bedtime PRN Insomnia      FAMILY HISTORY:      SOCIAL HISTORY:    CIGARETTES:    ALCOHOL:    REVIEW OF SYSTEMS:    CONSTITUTIONAL: No fever, weight loss, chills, shakes, or fatigue  EYES: No eye pain, visual disturbances, or discharge  ENMT:  No difficulty hearing, tinnitus, vertigo; No sinus or throat pain  NECK: No pain or stiffness  BREASTS: No pain, masses, or nipple discharge  RESPIRATORY: No cough, wheezing, hemoptysis, or shortness of breath  CARDIOVASCULAR: No chest pain, dyspnea, palpitations, dizziness, syncope, paroxysmal nocturnal dyspnea, orthopnea, or arm or leg swelling  GASTROINTESTINAL: No abdominal  or epigastric pain, nausea, vomiting, hematemesis, diarrhea, constipation, melena or bright red blood.  GENITOURINARY: No dysuria, nocturia, hematuria, or urinary incontinence  NEUROLOGICAL: No headaches, memory loss, slurred speech, limb weakness, loss of strength, numbness, or tremors  SKIN: No itching, burning, rashes, or lesions   LYMPH NODES: No enlarged glands  ENDOCRINE: No heat or cold intolerance, or hair loss  MUSCULOSKELETAL: No joint pain or swelling, muscle, back, or extremity pain  PSYCHIATRIC: No depression, anxiety, or difficulty sleeping  HEME/LYMPH: No easy bruising or bleeding gums  ALLERY AND IMMUNOLOGIC: No hives or rash.      Vital Signs Last 24 Hrs  T(C): 36.7 (16 Dec 2018 17:46), Max: 36.8 (16 Dec 2018 00:05)  T(F): 98 (16 Dec 2018 17:46), Max: 98.2 (16 Dec 2018 00:05)  HR: 65 (16 Dec 2018 17:46) (64 - 76)  BP: 119/77 (16 Dec 2018 17:46) (100/62 - 122/68)  BP(mean): --  RR: 17 (16 Dec 2018 17:46) (17 - 18)  SpO2: 98% (16 Dec 2018 17:46) (97% - 98%)    PHYSICAL EXAM:    GENERAL: In no apparent distress, well nourished, and hydrated.  HEAD:  Atraumatic, Normocephalic  EYES: EOMI, PERRLA, conjunctiva and sclera clear  ENMT: No tonsillar erythema, exudates, or enlargement; Moist mucous membranes, Good dentition, No lesions  NECK: Supple and normal thyroid.  No JVD or carotid bruit.  Carotid pulse is 2+ bilaterally.  HEART: Regular rate and rhythm; No murmurs, rubs, or gallops.  PULMONARY: Clear to auscultation and perfusion.  No rales, wheezing, or rhonchi bilaterally.  ABDOMEN: Soft, Nontender, Nondistended; Bowel sounds present  EXTREMITIES:  2+ Peripheral Pulses, No clubbing, cyanosis, or edema  LYMPH: No lymphadenopathy noted  NEUROLOGICAL: Grossly nonfocal          INTERPRETATION OF TELEMETRY:    ECG:    I&O's Detail    15 Dec 2018 07:01  -  16 Dec 2018 07:00  --------------------------------------------------------  IN:    dextrose 5% + sodium chloride 0.45%.: 600 mL    Oral Fluid: 600 mL  Total IN: 1200 mL    OUT:  Total OUT: 0 mL    Total NET: 1200 mL      16 Dec 2018 07:01  -  16 Dec 2018 18:25  --------------------------------------------------------  IN:    dextrose 5% + sodium chloride 0.45%.: 600 mL    Oral Fluid: 560 mL  Total IN: 1160 mL    OUT:  Total OUT: 0 mL    Total NET: 1160 mL          LABS:                        13.0   4.58  )-----------( 277      ( 16 Dec 2018 07:16 )             37.8     12-16    141  |  110<H>  |  12  ----------------------------<  78  3.7   |  22  |  0.63    Ca    8.1<L>      16 Dec 2018 07:16      CARDIAC MARKERS ( 14 Dec 2018 23:20 )  <.015 ng/mL / x     / x     / x     / x      CARDIAC MARKERS ( 14 Dec 2018 20:54 )  <.015 ng/mL / x     / 92 U/L / x     / <1.0 ng/mL        Urinalysis Basic - ( 16 Dec 2018 14:53 )    Color: Red / Appearance: very cloudy / S.015 / pH: x  Gluc: x / Ketone: Negative  / Bili: Negative / Urobili: Negative mg/dL   Blood: x / Protein: 100 mg/dL / Nitrite: Negative   Leuk Esterase: Trace / RBC: >50 /HPF / WBC 6-10   Sq Epi: x / Non Sq Epi: Many / Bacteria: Moderate      BNP  I&O's Detail    15 Dec 2018 07:  -  16 Dec 2018 07:00  --------------------------------------------------------  IN:    dextrose 5% + sodium chloride 0.45%.: 600 mL    Oral Fluid: 600 mL  Total IN: 1200 mL    OUT:  Total OUT: 0 mL    Total NET: 1200 mL      16 Dec 2018 07:  -  16 Dec 2018 18:25  --------------------------------------------------------  IN:    dextrose 5% + sodium chloride 0.45%.: 600 mL    Oral Fluid: 560 mL  Total IN: 1160 mL    OUT:  Total OUT: 0 mL    Total NET: 1160 mL        Daily     Daily Weight in k (16 Dec 2018 05:30)    RADIOLOGY & ADDITIONAL STUDIES:
Patient is a 33y old  Female who presents with a chief complaint of Chest pain (16 Dec 2018 18:25)        PAST MEDICAL & SURGICAL HISTORY:  Asthma  Endometriosis  Endometriosis of pelvis      Summary of admission HPI:                PREVIOUS DIAGNOSTIC TESTING:      ECHO  FINDINGS:    STRESS  FINDINGS:    CATHETERIZATION  FINDINGS:    ELECTROPHYSIOLOGY STUDY  FINDINGS:    CAROTID ULTRASOUND:  FINDINGS    VENOUS DUPLEX SCAN:  FINDINGS:    CHEST CT PULMONARY ANGIO with IV Contrast:  FINDINGS:  MEDICATIONS  (STANDING):  colchicine 0.6 milliGRAM(s) Oral three times a day  dextrose 5% + sodium chloride 0.45%. 1000 milliLiter(s) (50 mL/Hr) IV Continuous <Continuous>  indomethacin 25 milliGRAM(s) Oral three times a day  metoclopramide Injectable 10 milliGRAM(s) IV Push every 6 hours  pantoprazole    Tablet 40 milliGRAM(s) Oral before breakfast  regadenoson Injectable 0.4 milliGRAM(s) IV Push once    MEDICATIONS  (PRN):  ALBUTerol    90 MICROgram(s) HFA Inhaler 2 Puff(s) Inhalation every 6 hours PRN Shortness of Breath and/or Wheezing  zolpidem 5 milliGRAM(s) Oral at bedtime PRN Insomnia      FAMILY HISTORY:      SOCIAL HISTORY:    CIGARETTES:    ALCOHOL:    REVIEW OF SYSTEMS:    CONSTITUTIONAL: No fever, weight loss, chills, shakes, or fatigue  EYES: No eye pain, visual disturbances, or discharge  ENMT:  No difficulty hearing, tinnitus, vertigo; No sinus or throat pain  NECK: No pain or stiffness  BREASTS: No pain, masses, or nipple discharge  RESPIRATORY: No cough, wheezing, hemoptysis, or shortness of breath  CARDIOVASCULAR: No chest pain, dyspnea, palpitations, dizziness, syncope, paroxysmal nocturnal dyspnea, orthopnea, or arm or leg swelling  GASTROINTESTINAL: No abdominal  or epigastric pain, nausea, vomiting, hematemesis, diarrhea, constipation, melena or bright red blood.  GENITOURINARY: No dysuria, nocturia, hematuria, or urinary incontinence  NEUROLOGICAL: No headaches, memory loss, slurred speech, limb weakness, loss of strength, numbness, or tremors  SKIN: No itching, burning, rashes, or lesions   LYMPH NODES: No enlarged glands  ENDOCRINE: No heat or cold intolerance, or hair loss  MUSCULOSKELETAL: No joint pain or swelling, muscle, back, or extremity pain  PSYCHIATRIC: No depression, anxiety, or difficulty sleeping  HEME/LYMPH: No easy bruising or bleeding gums  ALLERY AND IMMUNOLOGIC: No hives or rash.      Vital Signs Last 24 Hrs  T(C): 36.6 (17 Dec 2018 05:44), Max: 36.7 (16 Dec 2018 11:02)  T(F): 97.9 (17 Dec 2018 05:44), Max: 98 (16 Dec 2018 11:02)  HR: 72 (17 Dec 2018 05:44) (65 - 84)  BP: 100/63 (17 Dec 2018 05:44) (100/63 - 122/68)  BP(mean): --  RR: 18 (17 Dec 2018 05:44) (17 - 18)  SpO2: 98% (17 Dec 2018 05:44) (95% - 98%)    PHYSICAL EXAM:    GENERAL: In no apparent distress, well nourished, and hydrated.  HEAD:  Atraumatic, Normocephalic  EYES: EOMI, PERRLA, conjunctiva and sclera clear  ENMT: No tonsillar erythema, exudates, or enlargement; Moist mucous membranes, Good dentition, No lesions  NECK: Supple and normal thyroid.  No JVD or carotid bruit.  Carotid pulse is 2+ bilaterally.  HEART: Regular rate and rhythm; No murmurs, rubs, or gallops.  PULMONARY: Clear to auscultation and perfusion.  No rales, wheezing, or rhonchi bilaterally.  ABDOMEN: Soft, Nontender, Nondistended; Bowel sounds present  EXTREMITIES:  2+ Peripheral Pulses, No clubbing, cyanosis, or edema  LYMPH: No lymphadenopathy noted  NEUROLOGICAL: Grossly nonfocal          INTERPRETATION OF TELEMETRY:    ECG:    I&O's Detail    16 Dec 2018 07:01  -  17 Dec 2018 07:00  --------------------------------------------------------  IN:    dextrose 5% + sodium chloride 0.45%.: 600 mL    Oral Fluid: 800 mL  Total IN: 1400 mL    OUT:  Total OUT: 0 mL    Total NET: 1400 mL          LABS:                        13.0   4.58  )-----------( 277      ( 16 Dec 2018 07:16 )             37.8     12-17    140  |  110<H>  |  12  ----------------------------<  80  3.8   |  22  |  0.57    Ca    7.8<L>      17 Dec 2018 06:26            Urinalysis Basic - ( 16 Dec 2018 14:53 )    Color: Red / Appearance: very cloudy / S.015 / pH: x  Gluc: x / Ketone: Negative  / Bili: Negative / Urobili: Negative mg/dL   Blood: x / Protein: 100 mg/dL / Nitrite: Negative   Leuk Esterase: Trace / RBC: >50 /HPF / WBC 6-10   Sq Epi: x / Non Sq Epi: Many / Bacteria: Moderate      BNP  I&O's Detail    16 Dec 2018 07:01  -  17 Dec 2018 07:00  --------------------------------------------------------  IN:    dextrose 5% + sodium chloride 0.45%.: 600 mL    Oral Fluid: 800 mL  Total IN: 1400 mL    OUT:  Total OUT: 0 mL    Total NET: 1400 mL        Daily     Daily Weight in k.2 (17 Dec 2018 05:44)    RADIOLOGY & ADDITIONAL STUDIES:
Patient is a 33y old  Female who presents with a chief complaint of Chest pain (17 Dec 2018 09:05)      SUBJECTIVE/OBJECTIVE:    For Stress test      MEDICATIONS  (STANDING):  colchicine 0.6 milliGRAM(s) Oral three times a day  dextrose 5% + sodium chloride 0.45%. 1000 milliLiter(s) (50 mL/Hr) IV Continuous <Continuous>  indomethacin 25 milliGRAM(s) Oral three times a day  metoclopramide Injectable 10 milliGRAM(s) IV Push every 6 hours  pantoprazole    Tablet 40 milliGRAM(s) Oral before breakfast    MEDICATIONS  (PRN):  ALBUTerol    90 MICROgram(s) HFA Inhaler 2 Puff(s) Inhalation every 6 hours PRN Shortness of Breath and/or Wheezing  zolpidem 5 milliGRAM(s) Oral at bedtime PRN Insomnia      Allergies    No Known Allergies    Intolerances    Phenergan (Unknown)  Zofran (Other)        Vital Signs Last 24 Hrs  T(C): 36.6 (17 Dec 2018 05:44), Max: 36.7 (16 Dec 2018 17:46)  T(F): 97.9 (17 Dec 2018 05:44), Max: 98 (16 Dec 2018 17:46)  HR: 72 (17 Dec 2018 05:44) (65 - 84)  BP: 100/63 (17 Dec 2018 05:44) (100/63 - 119/77)  BP(mean): --  RR: 18 (17 Dec 2018 05:44) (17 - 18)  SpO2: 98% (17 Dec 2018 05:44) (95% - 98%)    PHYSICAL EXAM:  GENERAL: NAD, well-groomed, well-developed  HEAD:  Atraumatic, Normocephalic  EYES: EOMI, PERRLA, conjunctiva and sclera clear  ENMT: No tonsillar erythema, exudates, or enlargement; Moist mucous membranes, No lesions  NECK: Supple, No JVD, Normal thyroid  NERVOUS SYSTEM:  Alert & Oriented X3; Motor Strength 5/5 B/L upper and lower extremities; DTRs 2+ intact and symmetric  CHEST/LUNG: Clear bilaterally; No rales, rhonchi, wheezing, or rubs  HEART: Regular rate and rhythm; No murmurs, rubs, or gallops  ABDOMEN: Soft, Nontender, Nondistended; Bowel sounds present  EXTREMITIES:  2+ Peripheral Pulses, No clubbing, cyanosis, or edema  LYMPH: No lymphadenopathy noted  SKIN: No rashes or lesions    LABS:                        13.0   4.58  )-----------( 277      ( 16 Dec 2018 07:16 )             37.8     12-17    140  |  110<H>  |  12  ----------------------------<  80  3.8   |  22  |  0.57    Ca    7.8<L>      17 Dec 2018 06:26        Urinalysis Basic - ( 16 Dec 2018 14:53 )    Color: Red / Appearance: very cloudy / S.015 / pH: x  Gluc: x / Ketone: Negative  / Bili: Negative / Urobili: Negative mg/dL   Blood: x / Protein: 100 mg/dL / Nitrite: Negative   Leuk Esterase: Trace / RBC: >50 /HPF / WBC 6-10   Sq Epi: x / Non Sq Epi: Many / Bacteria: Moderate      CAPILLARY BLOOD GLUCOSE              RADIOLOGY & ADDITIONAL TESTS:        Consultant(s) Notes Reviewed:  [xxx ] YES  [ ] NO
Patient is a 33y old  Female who presents with a chief complaint of Chest pain (18 Dec 2018 08:35)        PAST MEDICAL & SURGICAL HISTORY:  Asthma  Endometriosis  Endometriosis of pelvis      Summary of admission HPI:                PREVIOUS DIAGNOSTIC TESTING:      ECHO  FINDINGS:    STRESS  FINDINGS:    CATHETERIZATION  FINDINGS:    ELECTROPHYSIOLOGY STUDY  FINDINGS:    CAROTID ULTRASOUND:  FINDINGS    VENOUS DUPLEX SCAN:  FINDINGS:    CHEST CT PULMONARY ANGIO with IV Contrast:  FINDINGS:  MEDICATIONS  (STANDING):  colchicine 0.6 milliGRAM(s) Oral three times a day  dextrose 5% + sodium chloride 0.45%. 1000 milliLiter(s) (50 mL/Hr) IV Continuous <Continuous>  indomethacin 25 milliGRAM(s) Oral three times a day  metoclopramide Injectable 10 milliGRAM(s) IV Push every 6 hours  pantoprazole    Tablet 40 milliGRAM(s) Oral before breakfast    MEDICATIONS  (PRN):  ALBUTerol    90 MICROgram(s) HFA Inhaler 2 Puff(s) Inhalation every 6 hours PRN Shortness of Breath and/or Wheezing  zolpidem 5 milliGRAM(s) Oral at bedtime PRN Insomnia      FAMILY HISTORY:      SOCIAL HISTORY:    CIGARETTES:    ALCOHOL:    REVIEW OF SYSTEMS:    CONSTITUTIONAL: No fever, weight loss, chills, shakes, or fatigue  EYES: No eye pain, visual disturbances, or discharge  ENMT:  No difficulty hearing, tinnitus, vertigo; No sinus or throat pain  NECK: No pain or stiffness  BREASTS: No pain, masses, or nipple discharge  RESPIRATORY: No cough, wheezing, hemoptysis, or shortness of breath  CARDIOVASCULAR: No chest pain, dyspnea, palpitations, dizziness, syncope, paroxysmal nocturnal dyspnea, orthopnea, or arm or leg swelling  GASTROINTESTINAL: No abdominal  or epigastric pain, nausea, vomiting, hematemesis, diarrhea, constipation, melena or bright red blood.  GENITOURINARY: No dysuria, nocturia, hematuria, or urinary incontinence  NEUROLOGICAL: No headaches, memory loss, slurred speech, limb weakness, loss of strength, numbness, or tremors  SKIN: No itching, burning, rashes, or lesions   LYMPH NODES: No enlarged glands  ENDOCRINE: No heat or cold intolerance, or hair loss  MUSCULOSKELETAL: No joint pain or swelling, muscle, back, or extremity pain  PSYCHIATRIC: No depression, anxiety, or difficulty sleeping  HEME/LYMPH: No easy bruising or bleeding gums  ALLERY AND IMMUNOLOGIC: No hives or rash.      Vital Signs Last 24 Hrs  T(C): 36.4 (18 Dec 2018 05:00), Max: 36.8 (17 Dec 2018 18:15)  T(F): 97.6 (18 Dec 2018 05:00), Max: 98.3 (17 Dec 2018 18:15)  HR: 63 (18 Dec 2018 05:00) (63 - 68)  BP: 98/61 (18 Dec 2018 05:00) (98/61 - 103/69)  BP(mean): --  RR: 17 (18 Dec 2018 05:00) (16 - 17)  SpO2: 98% (18 Dec 2018 05:00) (98% - 99%)    PHYSICAL EXAM:    GENERAL: In no apparent distress, well nourished, and hydrated.  HEAD:  Atraumatic, Normocephalic  EYES: EOMI, PERRLA, conjunctiva and sclera clear  ENMT: No tonsillar erythema, exudates, or enlargement; Moist mucous membranes, Good dentition, No lesions  NECK: Supple and normal thyroid.  No JVD or carotid bruit.  Carotid pulse is 2+ bilaterally.  HEART: Regular rate and rhythm; No murmurs, rubs, or gallops.  PULMONARY: Clear to auscultation and perfusion.  No rales, wheezing, or rhonchi bilaterally.  ABDOMEN: Soft, Nontender, Nondistended; Bowel sounds present  EXTREMITIES:  2+ Peripheral Pulses, No clubbing, cyanosis, or edema  LYMPH: No lymphadenopathy noted  NEUROLOGICAL: Grossly nonfocal          INTERPRETATION OF TELEMETRY:    ECG:    I&O's Detail    17 Dec 2018 07:01  -  18 Dec 2018 07:00  --------------------------------------------------------  IN:    Oral Fluid: 240 mL  Total IN: 240 mL    OUT:  Total OUT: 0 mL    Total NET: 240 mL          LABS:                        12.5   4.75  )-----------( 291      ( 18 Dec 2018 06:35 )             37.1         141  |  109<H>  |  13  ----------------------------<  90  3.9   |  25  |  0.57    Ca    8.1<L>      18 Dec 2018 06:35            Urinalysis Basic - ( 16 Dec 2018 14:53 )    Color: Red / Appearance: very cloudy / S.015 / pH: x  Gluc: x / Ketone: Negative  / Bili: Negative / Urobili: Negative mg/dL   Blood: x / Protein: 100 mg/dL / Nitrite: Negative   Leuk Esterase: Trace / RBC: >50 /HPF / WBC 6-10   Sq Epi: x / Non Sq Epi: Many / Bacteria: Moderate      BNP  I&O's Detail    17 Dec 2018 07:01  -  18 Dec 2018 07:00  --------------------------------------------------------  IN:    Oral Fluid: 240 mL  Total IN: 240 mL    OUT:  Total OUT: 0 mL    Total NET: 240 mL        Daily     Daily Weight in k.1 (18 Dec 2018 05:00)    RADIOLOGY & ADDITIONAL STUDIES:
Patient is a 33y old  Female who presents with a chief complaint of Chest pain (15 Dec 2018 18:26)      SUBJECTIVE/OBJECTIVE:    c/o Chest pain and nausea.    MEDICATIONS  (STANDING):  colchicine 0.6 milliGRAM(s) Oral three times a day  dextrose 5% + sodium chloride 0.45%. 1000 milliLiter(s) (50 mL/Hr) IV Continuous <Continuous>  indomethacin 25 milliGRAM(s) Oral three times a day  metoclopramide Injectable 10 milliGRAM(s) IV Push every 6 hours  pantoprazole    Tablet 40 milliGRAM(s) Oral before breakfast    MEDICATIONS  (PRN):  ALBUTerol    90 MICROgram(s) HFA Inhaler 2 Puff(s) Inhalation every 6 hours PRN Shortness of Breath and/or Wheezing  zolpidem 5 milliGRAM(s) Oral at bedtime PRN Insomnia      Allergies    No Known Allergies    Intolerances    Phenergan (Unknown)  Zofran (Other)        Vital Signs Last 24 Hrs  T(C): 36.7 (16 Dec 2018 11:02), Max: 37.3 (15 Dec 2018 17:00)  T(F): 98 (16 Dec 2018 11:02), Max: 99.1 (15 Dec 2018 17:00)  HR: 76 (16 Dec 2018 11:02) (64 - 76)  BP: 122/68 (16 Dec 2018 11:02) (100/62 - 122/68)  BP(mean): --  RR: 17 (16 Dec 2018 11:02) (17 - 18)  SpO2: 97% (16 Dec 2018 11:02) (97% - 99%)      PHYSICAL EXAM:  GENERAL: NAD, well-groomed, well-developed  HEAD:  Atraumatic, Normocephalic  EYES: EOMI, PERRLA, conjunctiva and sclera clear  ENMT: No tonsillar erythema, exudates, or enlargement; Moist mucous membranes, No lesions  NECK: Supple, No JVD, Normal thyroid  NERVOUS SYSTEM:  Alert & Oriented X3; Motor Strength 5/5 B/L upper and lower extremities; DTRs 2+ intact and symmetric  CHEST/LUNG: Clear bilaterally; No rales, rhonchi, wheezing, or rubs;  + Tenderness chest wall  HEART: Regular rate and rhythm; No murmurs, rubs, or gallops  ABDOMEN: Soft, Nontender, Nondistended; Bowel sounds present  EXTREMITIES:  2+ Peripheral Pulses, No clubbing, cyanosis, or edema  LYMPH: No lymphadenopathy noted  SKIN: No rashes or lesions                LABS:                        13.0   4.58  )-----------( 277      ( 16 Dec 2018 07:16 )             37.8     12-16    141  |  110<H>  |  12  ----------------------------<  78  3.7   |  22  |  0.63    Ca    8.1<L>      16 Dec 2018 07:16  Mg     2.1     12-14    TPro  7.7  /  Alb  3.7  /  TBili  0.5  /  DBili  x   /  AST  14<L>  /  ALT  18  /  AlkPhos  70  12-14        CAPILLARY BLOOD GLUCOSE        CARDIAC MARKERS ( 14 Dec 2018 23:20 )  <.015 ng/mL / x     / x     / x     / x      CARDIAC MARKERS ( 14 Dec 2018 20:54 )  <.015 ng/mL / x     / 92 U/L / x     / <1.0 ng/mL  CARDIAC MARKERS ( 14 Dec 2018 16:06 )  <.015 ng/mL / x     / x     / x     / x            RADIOLOGY & ADDITIONAL TESTS:    < from: TTE Echo Doppler w/o Cont (12.15.18 @ 12:45) >     PHYSICIAN INTERPRETATION:  Left Ventricle: Normal left ventricular size and wall thicknesses, with   normal systolic function.  Left ventricular ejection fraction, by visual estimation, is 60 to 65%.  Right Ventricle: Normal right ventricular size and function.  Left Atrium: The left atrium is normal in size. Normal left atrial size.  Right Atrium: The right atrium is normal in size. Normal right atrial   size.  Pericardium: There is no evidence of pericardial thickening or effusion.  Mitral Valve: Structurally normal mitral valve, with normal leaflet   excursion. Trace mitral valve regurgitation is seen.  Tricuspid Valve: Structurally normal tricuspid valve, with normal leaflet   excursion. Mild tricuspid regurgitation is visualized.  Aortic Valve: Normal trileaflet aortic valve with normal opening. Peak Av   velocity is 1.25 m/s, mean transaortic gradient equals 3.5 mmHg, the   calculated aortic valve area equals 3.44 cm² by the continuity equation   consistent with normally opening aortic valve. The aortic valve mean   gradient is 3.5 mmHgconsistent with normally opening aortic valve. No   evidence of aortic valve regurgitation is seen.  Pulmonic Valve: Structurally normal pulmonic valve, with normal leaflet   excursion. Mild pulmonic valve regurgitation.  Aorta: The aortic root and ascending aorta are structurally normal, with   no evidence of dilitation.  Pulmonary Artery: The main pulmonary artery is normal in size.      Summary:   1. Left ventricular ejection fraction, by visual estimation, is 60 to   65%.   2. There is no left ventricular hypertrophy.   3. Mild tricuspid regurgitation.   4. Pulmonic valve regurgitation.    < end of copied text >      Consultant(s) Notes Reviewed:  [xx ] YES  [ ] NO
Patient is a 33y old  Female who presents with a chief complaint of Chest pain (14 Dec 2018 21:44)      SUBJECTIVE/OBJECTIVE:    c/o chest pain.    MEDICATIONS  (STANDING):  colchicine 0.6 milliGRAM(s) Oral three times a day  dextrose 5% + sodium chloride 0.45%. 1000 milliLiter(s) (50 mL/Hr) IV Continuous <Continuous>  indomethacin 25 milliGRAM(s) Oral three times a day  pantoprazole    Tablet 40 milliGRAM(s) Oral before breakfast    MEDICATIONS  (PRN):  ALBUTerol    90 MICROgram(s) HFA Inhaler 2 Puff(s) Inhalation every 6 hours PRN Shortness of Breath and/or Wheezing  ketorolac   Injectable 30 milliGRAM(s) IV Push every 6 hours PRN Severe Pain (7 - 10)  zolpidem 5 milliGRAM(s) Oral at bedtime PRN Insomnia      Allergies    No Known Allergies    Intolerances    Phenergan (Unknown)  Zofran (Other)        Vital Signs Last 24 Hrs  T(C): 36.7 (15 Dec 2018 05:28), Max: 36.8 (15 Dec 2018 00:27)  T(F): 98 (15 Dec 2018 05:28), Max: 98.2 (15 Dec 2018 00:27)  HR: 68 (15 Dec 2018 05:28) (68 - 78)  BP: 103/67 (15 Dec 2018 05:28) (103/67 - 129/75)  BP(mean): --  RR: 18 (15 Dec 2018 05:28) (18 - 18)  SpO2: 97% (15 Dec 2018 05:28) (96% - 99%)    PHYSICAL EXAM:  GENERAL: NAD, well-groomed, well-developed  HEAD:  Atraumatic, Normocephalic  EYES: EOMI, PERRLA, conjunctiva and sclera clear  ENMT: No tonsillar erythema, exudates, or enlargement; Moist mucous membranes, No lesions  NECK: Supple, No JVD, Normal thyroid  NERVOUS SYSTEM:  Alert & Oriented X3; Motor Strength 5/5 B/L upper and lower extremities; DTRs 2+ intact and symmetric  CHEST/LUNG: Clear bilaterally; No rales, rhonchi, wheezing, or rubs;  + Tenderness chest wall  HEART: Regular rate and rhythm; No murmurs, rubs, or gallops  ABDOMEN: Soft, Nontender, Nondistended; Bowel sounds present  EXTREMITIES:  2+ Peripheral Pulses, No clubbing, cyanosis, or edema  LYMPH: No lymphadenopathy noted  SKIN: No rashes or lesions    LABS:                        13.3   5.25  )-----------( 283      ( 14 Dec 2018 16:06 )             40.6     12-14    141  |  109<H>  |  11  ----------------------------<  101<H>  3.8   |  25  |  0.80    Ca    8.6      14 Dec 2018 16:06  Mg     2.1     12-14    TPro  7.7  /  Alb  3.7  /  TBili  0.5  /  DBili  x   /  AST  14<L>  /  ALT  18  /  AlkPhos  70  12-14        CAPILLARY BLOOD GLUCOSE        CARDIAC MARKERS ( 14 Dec 2018 23:20 )  <.015 ng/mL / x     / x     / x     / x      CARDIAC MARKERS ( 14 Dec 2018 20:54 )  <.015 ng/mL / x     / 92 U/L / x     / <1.0 ng/mL  CARDIAC MARKERS ( 14 Dec 2018 16:06 )  <.015 ng/mL / x     / x     / x     / x            RADIOLOGY & ADDITIONAL TESTS:        Consultant(s) Notes Reviewed:  [ccc ] YES  [ ] NO

## 2018-12-18 NOTE — DISCHARGE NOTE ADULT - CARE PROVIDER_API CALL
Jane Arteaga), Medicine  [address] [facility] [address] [place] [address]  Phone: [phone]  Fax: [phone]    Jus Castellanos), Internal Medicine  [address] [facility]  [address] [place] [address]  Phone: [phone]  Fax: [phone]

## 2018-12-18 NOTE — DISCHARGE NOTE ADULT - CARE PLAN
Principal Discharge DX:	Other chest pain  Goal:	resolve  Assessment and plan of treatment:	Medication and followup  Secondary Diagnosis:	Acute cystitis with hematuria  Goal:	resolve  Assessment and plan of treatment:	Abx

## 2018-12-18 NOTE — DISCHARGE NOTE ADULT - PATIENT PORTAL LINK FT
You can access the [facility] Patient Portal, offered by [facility], by registering with the following website: [email or web address]

## 2018-12-18 NOTE — CHART NOTE - NSCHARTNOTEFT_GEN_A_CORE
To Whom It May Concern,    Please excuse Harmony Blake,  1985 from work. She was admitted to the hospital on 12/15/18 and can return to work on 2018.    Thank you for your understanding  If you have any questions please call [phone]    Melania Gant PA-C  On behalf of Dr. Arteaga

## 2018-12-19 LAB
CULTURE RESULTS: SIGNIFICANT CHANGE UP
SPECIMEN SOURCE: SIGNIFICANT CHANGE UP

## 2018-12-20 DIAGNOSIS — Z79.1 LONG TERM (CURRENT) USE OF NON-STEROIDAL ANTI-INFLAMMATORIES (NSAID): ICD-10-CM

## 2018-12-20 DIAGNOSIS — R07.89 OTHER CHEST PAIN: ICD-10-CM

## 2018-12-20 DIAGNOSIS — Z79.891 LONG TERM (CURRENT) USE OF OPIATE ANALGESIC: ICD-10-CM

## 2018-12-20 DIAGNOSIS — N39.0 URINARY TRACT INFECTION, SITE NOT SPECIFIED: ICD-10-CM

## 2018-12-20 DIAGNOSIS — M94.0 CHONDROCOSTAL JUNCTION SYNDROME [TIETZE]: ICD-10-CM

## 2018-12-20 DIAGNOSIS — J45.21 MILD INTERMITTENT ASTHMA WITH (ACUTE) EXACERBATION: ICD-10-CM

## 2018-12-20 DIAGNOSIS — R07.9 CHEST PAIN, UNSPECIFIED: ICD-10-CM

## 2018-12-20 DIAGNOSIS — I31.9 DISEASE OF PERICARDIUM, UNSPECIFIED: ICD-10-CM

## 2019-01-08 NOTE — ED ADULT NURSE NOTE - ED STAT RN HANDOFF DETAILS
Reverse Total Shoulder Arthoplasty Discharge Summary    Patient ID:  Rosibel Buck  1947  73 y.o.  [id number]    Admit date: 1/7/2019    Discharge date and time: No discharge date for patient encounter. Admitting Physician: Delroy Tran MD     Admission Diagnoses: RIGHT SHOULDER OSTEO ARTHRITIS  Osteoarthritis of right shoulder    Discharge Diagnoses: Active Problems:    Osteoarthritis of right shoulder (1/7/2019)        HISTORY OF PRESENT ILLNESS:  Rosibel Buck is a pleasant 73 y.o. 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 The patient failed all conservative management. Therefore, Dr. Maxx Ludwig and the patient decided the most appropriate plan of care is to proceed with a reverse total shoulder arthroplasty, to be preformed at [place] [address]. All preoperative clearance was done prior to surgery. The patient's complete history and physical, laboratory data and physical exam is well documented in hospital chart. HOSPITAL COURSE:  Rosibel Buck was admitted on1/7/2019 and underwent successful reverse total shoulder arthroplasty. There were no complications intraoperatively and the patient was transferred to the orthopaedic floor in stable condition. A consultations was made to a primary care physician whom continued to monitor the patient throughout the patient's hospitalizations. Physical therapy and occupational therapy initiated their evaluation and treatment and continued to follow the patient until the patient was discharged. For pain control, an interscalene nerve block was used, to be discontinued on post-operative day 2. The patient then was transitioned over oral narcotics in which was tolerated well. The incision site remained clean, dry, and intact. The patient remained neurovascularly intact.  At the time of discharge, the patient was able to ambulate safely, go up and down stairs and had an understanding of the explicit discharge precautions and instructions following surgery. Post Op complications: none    Current Discharge Medication List      START taking these medications    Details   oxyCODONE-acetaminophen (PERCOCET) 5-325 mg per tablet Take 1 Tab by mouth every four (4) hours as needed for Pain. Max Daily Amount: 6 Tabs. Qty: 42 Tab, Refills: 0    Associated Diagnoses: Complete tear of rotator cuff, unspecified laterality         CONTINUE these medications which have CHANGED    Details   aspirin (ASPIRIN) 325 mg tablet Take 1 Tab by mouth two (2) times daily (with meals) for 14 days. Qty: 28 Tab, Refills: 0         CONTINUE these medications which have NOT CHANGED    Details   umeclidinium-vilanterol (ANORO ELLIPTA) 62.5-25 mcg/actuation inhaler Take 1 Puff by inhalation every morning. glucosamine sulfate (GLUCOSAMINE) 500 mg tablet Take 1,000 mg by mouth daily. magnesium 200 mg tab Take 1 Tab by mouth every morning. vitamin E (AQUA GEMS) 400 unit capsule Take 400 Units by mouth every morning. Omega-3 Fatty Acids 60- mg cpDR Take 2 Tabs by mouth every morning.      multivit with minerals/lutein (MULTIVITAMIN 50 PLUS PO) Take 1 Tab by mouth every morning. ascorbic acid, vitamin C, (VITAMIN C) 500 mg tablet Take 500 mg by mouth daily. CALCIUM PO Take 500 mg by mouth two (2) times a day. cholecalciferol (VITAMIN D3) 1,000 unit tablet Take 1,000 Units by mouth every morning. docusate sodium (DOK) 250 mg capsule Take 250 mg by mouth two (2) times a day. metFORMIN (GLUCOPHAGE) 500 mg tablet Take 500 mg by mouth two (2) times daily (with meals). montelukast (SINGULAIR) 10 mg tablet Take 10 mg by mouth every evening. lisinopril-hydrochlorothiazide (PRINZIDE, ZESTORETIC) 10-12.5 mg per tablet Take 1 Tab by mouth every morning. pravastatin (PRAVACHOL) 80 mg tablet Take 80 mg by mouth nightly.         latanoprost (XALATAN) 0.005 % ophthalmic solution Administer 1 Drop to both eyes nightly. STOP taking these medications       oxyCODONE-acetaminophen (PERCOCET)  mg per tablet Comments:   Reason for Stopping:               Discharged to: Home          Discharge instructions:  -Resume pre hospital diet.            -Resume home medications per medication reconciliation form. -Prescriptions for pain medication were provided to the patient.     -Patient to continue shoulder exercises as taught by the physical therapist.  -Ambulate with an assisted device as instructed by PT/OT prior to discharge.   -First follow-up appointment to be scheduled in 10 days. If patient is unaware of their appointment time/date, they are call Dr. Lieutenant Johnson office at [phone].  -Explicit discharge instructions were provided to the patient.     Marlene Hurtado MD TRANSFERRED TO MAIN ED , REPORTS GIVEN TO JEISON BROUSSARD, REPORTS GIVEN .NO COMPLAINTS AT PRESENT.

## 2019-04-22 ENCOUNTER — EMERGENCY (EMERGENCY)
Facility: HOSPITAL | Age: 34
LOS: 1 days | Discharge: LEFT BEFORE DISCHARGE | End: 2019-04-22
Admitting: EMERGENCY MEDICINE

## 2019-04-22 ENCOUNTER — EMERGENCY (EMERGENCY)
Facility: HOSPITAL | Age: 34
LOS: 1 days | Discharge: HOME | End: 2019-04-22
Attending: EMERGENCY MEDICINE
Payer: COMMERCIAL

## 2019-04-22 VITALS
HEART RATE: 75 BPM | RESPIRATION RATE: 17 BRPM | DIASTOLIC BLOOD PRESSURE: 90 MMHG | SYSTOLIC BLOOD PRESSURE: 134 MMHG | OXYGEN SATURATION: 100 % | TEMPERATURE: 98 F

## 2019-04-22 VITALS
DIASTOLIC BLOOD PRESSURE: 82 MMHG | TEMPERATURE: 98 F | SYSTOLIC BLOOD PRESSURE: 141 MMHG | HEART RATE: 72 BPM | RESPIRATION RATE: 18 BRPM | HEIGHT: 65 IN | WEIGHT: 179.9 LBS | OXYGEN SATURATION: 100 %

## 2019-04-22 DIAGNOSIS — N80.3 ENDOMETRIOSIS OF PELVIC PERITONEUM: Chronic | ICD-10-CM

## 2019-04-22 PROCEDURE — 99284 EMERGENCY DEPT VISIT MOD MDM: CPT

## 2019-04-22 RX ORDER — ACETAMINOPHEN 500 MG
650 TABLET ORAL ONCE
Qty: 0 | Refills: 0 | Status: COMPLETED | OUTPATIENT
Start: 2019-04-22 | End: 2019-04-22

## 2019-04-22 RX ADMIN — Medication 650 MILLIGRAM(S): at 23:52

## 2019-04-22 NOTE — ED PROVIDER NOTE - NSFOLLOWUPINSTRUCTIONS_ED_ALL_ED_FT
Return for excessive vaginal bleeding, passage of blood clots.    Patient should follow up with Obgyn outpatient   Can call [phone]- tell them you were seen in the ED

## 2019-04-22 NOTE — ED ADULT NURSE NOTE - OBJECTIVE STATEMENT
35 yo female presents to ED from home c/o 1 week of abdominal pain and cramping. Patient reports onset of cramping 1 week ago that has been intermittent since with worsening today. Patient states she took home pregnancy test today that was positive, LMP "end of february". Patient denies vaginal bleeding, SOB, CP, n/v/d, fever/chills, recent illness, falls/loc. Patient A&Ox4, breathing spontaneously, airway patent, bl clear lungs, abdomen nontender, +pulses, cap refill <2 seconds. Patient resting in bed, plan of care explained.

## 2019-04-22 NOTE — ED PROVIDER NOTE - OBJECTIVE STATEMENT
33 y/o  @ around 8 wks by LMP (end of February doesn't remember exact date) 35 y/o  @ around 8 wks by LMP (end of February doesn't remember exact date) presents for abdominal pain and cramping of one week duration. Patient has been on a cruise for the past week. Patient has history of endometriosis with 6 laparoscopic surgeries and L salpingectomy 2 years ago. Patient tried to get appointment with Obgyn but wasn't able to.

## 2019-04-22 NOTE — ED ADULT TRIAGE NOTE - CHIEF COMPLAINT QUOTE
intermittent abdominal/groin/lower back pain x 6 days. Patient is pregnant up to 8 weeks. Denies vaginal bleeding.

## 2019-04-22 NOTE — ED ADULT TRIAGE NOTE - CHIEF COMPLAINT QUOTE
pt c/o pelvic cramping. took positive pregnant test at home. LMP 2/15 denies vaginal bleeding/discharge.

## 2019-04-22 NOTE — ED PROVIDER NOTE - NSFOLLOWUPCLINICS_GEN_ALL_ED_FT
[facility] Gynecology and Obstetrics  Gynceology/OB  [address] [place] [address]  Phone: [phone]  Fax:   Follow Up Time:

## 2019-04-22 NOTE — ED PROVIDER NOTE - ATTENDING CONTRIBUTION TO CARE
Dr. Do (Attending Physician)   pw lower abd. cramping, mild nausea, and vertigo after getting off a cruise today, she suspects she is 8 weeks pregnant. No abd. tenderness on exam. No vaginal bleeding, urinary symptoms, anorexia, vomiting.  Will check hcg, ua, labs, u/s for iup give tylenol and reassess.

## 2019-04-22 NOTE — ED PROVIDER NOTE - CLINICAL SUMMARY MEDICAL DECISION MAKING FREE TEXT BOX
35 y/o  @ around 8 wks by LMP (end of February doesn't remember exact date) presents for abdominal pain and cramping of one week duration. Patient has been on a cruise for the past week. Today came back from [place] at noon and tried to get appt with Obgyn for confirmation of pregnancy, but wasn't able. Does not currently have care. Patient has history of endometriosis with 6 laparoscopic surgeries and L salpingectomy 2 years ago.    Will send pregnancy test, labs, TVUS 33 y/o  @ unknown weeks by LMP (end of February doesn't remember exact date and periods irregular) presents for abdominal pain and cramping of one week duration. Patient has been on a cruise for the past week. Today came back from [place] at noon and tried to get appt with Obgyn for confirmation of pregnancy, but wasn't able. Does not currently have care. Patient has history of endometriosis with 6 laparoscopic surgeries and L salpingectomy 2 years ago.    Will send pregnancy test, labs, TVUS

## 2019-04-23 VITALS
TEMPERATURE: 98 F | HEART RATE: 66 BPM | RESPIRATION RATE: 18 BRPM | DIASTOLIC BLOOD PRESSURE: 78 MMHG | SYSTOLIC BLOOD PRESSURE: 116 MMHG | OXYGEN SATURATION: 100 %

## 2019-04-23 LAB
ALBUMIN SERPL ELPH-MCNC: 4.2 G/DL — SIGNIFICANT CHANGE UP (ref 3.3–5)
ALP SERPL-CCNC: 90 U/L — SIGNIFICANT CHANGE UP (ref 40–120)
ALT FLD-CCNC: 45 U/L — SIGNIFICANT CHANGE UP (ref 10–45)
ANION GAP SERPL CALC-SCNC: 14 MMOL/L — SIGNIFICANT CHANGE UP (ref 5–17)
APPEARANCE UR: CLEAR — SIGNIFICANT CHANGE UP
AST SERPL-CCNC: 41 U/L — HIGH (ref 10–40)
BACTERIA # UR AUTO: NEGATIVE — SIGNIFICANT CHANGE UP
BILIRUB SERPL-MCNC: 0.4 MG/DL — SIGNIFICANT CHANGE UP (ref 0.2–1.2)
BILIRUB UR-MCNC: NEGATIVE — SIGNIFICANT CHANGE UP
BUN SERPL-MCNC: 11 MG/DL — SIGNIFICANT CHANGE UP (ref 7–23)
CALCIUM SERPL-MCNC: 10 MG/DL — SIGNIFICANT CHANGE UP (ref 8.4–10.5)
CHLORIDE SERPL-SCNC: 101 MMOL/L — SIGNIFICANT CHANGE UP (ref 96–108)
CO2 SERPL-SCNC: 22 MMOL/L — SIGNIFICANT CHANGE UP (ref 22–31)
COLOR SPEC: COLORLESS — SIGNIFICANT CHANGE UP
CREAT SERPL-MCNC: 0.62 MG/DL — SIGNIFICANT CHANGE UP (ref 0.5–1.3)
DIFF PNL FLD: NEGATIVE — SIGNIFICANT CHANGE UP
EPI CELLS # UR: 5 /HPF — SIGNIFICANT CHANGE UP
GLUCOSE SERPL-MCNC: 93 MG/DL — SIGNIFICANT CHANGE UP (ref 70–99)
GLUCOSE UR QL: NEGATIVE — SIGNIFICANT CHANGE UP
HCG SERPL-ACNC: 7147 MIU/ML — HIGH
HCT VFR BLD CALC: 38.7 % — SIGNIFICANT CHANGE UP (ref 34.5–45)
HGB BLD-MCNC: 13.1 G/DL — SIGNIFICANT CHANGE UP (ref 11.5–15.5)
HYALINE CASTS # UR AUTO: 1 /LPF — SIGNIFICANT CHANGE UP (ref 0–2)
KETONES UR-MCNC: NEGATIVE — SIGNIFICANT CHANGE UP
LEUKOCYTE ESTERASE UR-ACNC: NEGATIVE — SIGNIFICANT CHANGE UP
MCHC RBC-ENTMCNC: 31.8 PG — SIGNIFICANT CHANGE UP (ref 27–34)
MCHC RBC-ENTMCNC: 34 GM/DL — SIGNIFICANT CHANGE UP (ref 32–36)
MCV RBC AUTO: 93.6 FL — SIGNIFICANT CHANGE UP (ref 80–100)
NITRITE UR-MCNC: NEGATIVE — SIGNIFICANT CHANGE UP
PH UR: 7 — SIGNIFICANT CHANGE UP (ref 5–8)
PLATELET # BLD AUTO: 292 K/UL — SIGNIFICANT CHANGE UP (ref 150–400)
POTASSIUM SERPL-MCNC: 3.6 MMOL/L — SIGNIFICANT CHANGE UP (ref 3.5–5.3)
POTASSIUM SERPL-SCNC: 3.6 MMOL/L — SIGNIFICANT CHANGE UP (ref 3.5–5.3)
PROT SERPL-MCNC: 7.5 G/DL — SIGNIFICANT CHANGE UP (ref 6–8.3)
PROT UR-MCNC: NEGATIVE — SIGNIFICANT CHANGE UP
RBC # BLD: 4.13 M/UL — SIGNIFICANT CHANGE UP (ref 3.8–5.2)
RBC # FLD: 12.5 % — SIGNIFICANT CHANGE UP (ref 10.3–14.5)
RBC CASTS # UR COMP ASSIST: 3 /HPF — SIGNIFICANT CHANGE UP (ref 0–4)
SODIUM SERPL-SCNC: 137 MMOL/L — SIGNIFICANT CHANGE UP (ref 135–145)
SP GR SPEC: 1.01 — SIGNIFICANT CHANGE UP (ref 1.01–1.02)
UROBILINOGEN FLD QL: NEGATIVE — SIGNIFICANT CHANGE UP
WBC # BLD: 6 K/UL — SIGNIFICANT CHANGE UP (ref 3.8–10.5)
WBC # FLD AUTO: 6 K/UL — SIGNIFICANT CHANGE UP (ref 3.8–10.5)
WBC UR QL: 1 /HPF — SIGNIFICANT CHANGE UP (ref 0–5)

## 2019-04-23 PROCEDURE — 99284 EMERGENCY DEPT VISIT MOD MDM: CPT

## 2019-04-23 PROCEDURE — 76815 OB US LIMITED FETUS(S): CPT

## 2019-04-23 PROCEDURE — 93975 VASCULAR STUDY: CPT | Mod: 26

## 2019-04-23 PROCEDURE — 76815 OB US LIMITED FETUS(S): CPT | Mod: 26

## 2019-04-23 PROCEDURE — 85027 COMPLETE CBC AUTOMATED: CPT

## 2019-04-23 PROCEDURE — 84702 CHORIONIC GONADOTROPIN TEST: CPT

## 2019-04-23 PROCEDURE — 80053 COMPREHEN METABOLIC PANEL: CPT

## 2019-04-23 PROCEDURE — 81001 URINALYSIS AUTO W/SCOPE: CPT

## 2019-04-23 PROCEDURE — 93975 VASCULAR STUDY: CPT

## 2019-07-08 ENCOUNTER — EMERGENCY (EMERGENCY)
Facility: HOSPITAL | Age: 34
LOS: 1 days | Discharge: STILL ADMITTED OR ADMINISTRATIVE | End: 2019-07-08
Attending: STUDENT IN AN ORGANIZED HEALTH CARE EDUCATION/TRAINING PROGRAM | Admitting: EMERGENCY MEDICINE

## 2019-07-08 ENCOUNTER — OUTPATIENT (OUTPATIENT)
Dept: OUTPATIENT SERVICES | Facility: HOSPITAL | Age: 34
LOS: 1 days | Discharge: HOME | End: 2019-07-08

## 2019-07-08 VITALS
SYSTOLIC BLOOD PRESSURE: 114 MMHG | HEART RATE: 84 BPM | TEMPERATURE: 98 F | DIASTOLIC BLOOD PRESSURE: 80 MMHG | RESPIRATION RATE: 17 BRPM

## 2019-07-08 VITALS
TEMPERATURE: 98 F | SYSTOLIC BLOOD PRESSURE: 104 MMHG | OXYGEN SATURATION: 100 % | HEART RATE: 91 BPM | DIASTOLIC BLOOD PRESSURE: 80 MMHG | RESPIRATION RATE: 15 BRPM

## 2019-07-08 DIAGNOSIS — Z3A.00 WEEKS OF GESTATION OF PREGNANCY NOT SPECIFIED: ICD-10-CM

## 2019-07-08 DIAGNOSIS — N80.3 ENDOMETRIOSIS OF PELVIC PERITONEUM: Chronic | ICD-10-CM

## 2019-07-08 DIAGNOSIS — O26.899 OTHER SPECIFIED PREGNANCY RELATED CONDITIONS, UNSPECIFIED TRIMESTER: ICD-10-CM

## 2019-07-08 DIAGNOSIS — Z90.79 ACQUIRED ABSENCE OF OTHER GENITAL ORGAN(S): Chronic | ICD-10-CM

## 2019-07-08 NOTE — OB RN TRIAGE NOTE - PSH
Endometriosis of pelvis    History of salpingectomy  Right side Endometriosis of pelvis    H/O ovarian cystectomy    History of appendectomy    History of salpingectomy  Right side

## 2019-07-08 NOTE — OB RN TRIAGE NOTE - PMH
Asthma  No hospitalization or intubation, on PRN Albuterol, last attack in last summer  Endometriosis

## 2019-07-08 NOTE — ED ADULT TRIAGE NOTE - CHIEF COMPLAINT QUOTE
alert 16 weeks pregnant c/o low abdomen pain started yesterday c/o nausea   no vomiting or diarrhea   hx endometriosis left oophorectomy

## 2019-07-09 VITALS — HEART RATE: 75 BPM | SYSTOLIC BLOOD PRESSURE: 118 MMHG | DIASTOLIC BLOOD PRESSURE: 68 MMHG

## 2019-07-09 DIAGNOSIS — Z98.890 OTHER SPECIFIED POSTPROCEDURAL STATES: Chronic | ICD-10-CM

## 2019-07-09 DIAGNOSIS — Z90.49 ACQUIRED ABSENCE OF OTHER SPECIFIED PARTS OF DIGESTIVE TRACT: Chronic | ICD-10-CM

## 2019-07-09 LAB
APPEARANCE UR: CLEAR — SIGNIFICANT CHANGE UP
BILIRUB UR-MCNC: NEGATIVE — SIGNIFICANT CHANGE UP
BLOOD UR QL VISUAL: NEGATIVE — SIGNIFICANT CHANGE UP
COLOR SPEC: SIGNIFICANT CHANGE UP
GLUCOSE UR-MCNC: NEGATIVE — SIGNIFICANT CHANGE UP
KETONES UR-MCNC: NEGATIVE — SIGNIFICANT CHANGE UP
LEUKOCYTE ESTERASE UR-ACNC: NEGATIVE — SIGNIFICANT CHANGE UP
NITRITE UR-MCNC: NEGATIVE — SIGNIFICANT CHANGE UP
PH UR: 7 — SIGNIFICANT CHANGE UP (ref 5–8)
PROT UR-MCNC: NEGATIVE — SIGNIFICANT CHANGE UP
RBC CASTS # UR COMP ASSIST: SIGNIFICANT CHANGE UP (ref 0–?)
SP GR SPEC: 1.01 — SIGNIFICANT CHANGE UP (ref 1–1.04)
UROBILINOGEN FLD QL: NORMAL — SIGNIFICANT CHANGE UP
WBC UR QL: SIGNIFICANT CHANGE UP (ref 0–?)

## 2019-07-09 NOTE — OB PROVIDER TRIAGE NOTE - NSOBPROVIDERNOTE_OBGYN_ALL_OB_FT
Pt of OB/GYN. 35 yo  @ 16 2/7 weeks with complaints of constant lower abdominal pain/pulling since 7 am 19. Does not yet feel fetal movements. Denies lof or vaginal bleeding.    Current a/p complications: Denies     Allergies: NKDA  Medications: PNV  PMH: Endometriosis, asthma  PSH: Left salpingectomy, appendectomy, exploratory laparoscopy   OB/GYN: Endometriosis  Social: Denies   Psychosocial: Denies     Assessment/plan:  Vital Signs Last 24 Hrs  T(C): 37.4 (2019 01:24), Max: 37.4 (2019 01:24)  T(F): 99.32 (2019 01:24), Max: 99.32 (2019 01:24)  HR: 75 (2019 01:25) (75 - 91)  BP: 118/68 (2019 01:25) (104/80 - 118/68)  RR: 17 (2019 22:21) (15 - 17)  SpO2: 100% (2019 21:27) (100% - 100%)    TAUS , cephalic presentation, anterior placenta, mvp 3.68, bpp 8/8     SSE cervix appears closed     TVUS CL 3.4-3.7 cm no funneling or dynamic changes     ua pending Pt of OB/GYN. 35 yo  @ 16 2/7 weeks with complaints of constant lower abdominal pain/pulling since 7 am 19. Does not yet feel fetal movements. Denies lof or vaginal bleeding.    Current a/p complications: Denies     Allergies: NKDA  Medications: PNV  PMH: Endometriosis, asthma  PSH: Left salpingectomy, appendectomy, exploratory laparoscopy   OB/GYN: Endometriosis  Social: Denies   Psychosocial: Denies     Assessment/plan:  Vital Signs Last 24 Hrs  T(C): 37.4 (2019 01:24), Max: 37.4 (2019 01:24)  T(F): 99.32 (2019 01:24), Max: 99.32 (2019 01:24)  HR: 75 (2019 01:25) (75 - 91)  BP: 118/68 (2019 01:25) (104/80 - 118/68)  RR: 17 (2019 22:21) (15 - 17)  SpO2: 100% (2019 21:27) (100% - 100%)    TAUS , cephalic presentation, anterior placenta, mvp 3.68, bpp 8/8     SSE cervix appears closed     TVUS CL 3.4-3.7 cm no funneling or dynamic changes Pt of [facility]. 33 yo  @ 16 2/7 weeks with complaints of constant lower abdominal pain/pulling since 7 am 19. Does not yet feel fetal movements. Denies lof or vaginal bleeding.    Current a/p complications: Denies     Allergies: NKDA  Medications: PNV  PMH: Endometriosis, asthma  PSH: Left salpingectomy, appendectomy, exploratory laparoscopy   OB/GYN: Endometriosis  Social: Denies   Psychosocial: Denies     Assessment/plan:  Vital Signs Last 24 Hrs  T(C): 37.4 (2019 01:24), Max: 37.4 (2019 01:24)  T(F): 99.32 (2019 01:24), Max: 99.32 (2019 01:24)  HR: 75 (2019 01:25) (75 - 91)  BP: 118/68 (2019 01:25) (104/80 - 118/68)  RR: 17 (2019 22:21) (15 - 17)  SpO2: 100% (2019 21:27) (100% - 100%)    TAUS , cephalic presentation, anterior placenta, mvp 3.68, bpp 8/8     SSE cervix appears closed     TVUS CL 3.4-3.7 cm no funneling or dynamic changes    d/w Dr. Connor   -Discharge home   -Keep scheduled appt   -Fetal kick count reviewed   -PTL precautions reviewed

## 2019-07-09 NOTE — OB PROVIDER TRIAGE NOTE - PSH
Endometriosis of pelvis    H/O ovarian cystectomy    History of appendectomy    History of salpingectomy  Right side

## 2019-08-07 NOTE — ED ADULT NURSE NOTE - CAS EDP DISCH TYPE
Problem: Pain, Chronic (Adult)  Goal: Identify Related Risk Factors and Signs and Symptoms  Outcome: Ongoing (interventions implemented as appropriate)    Goal: Acceptable Pain/Comfort Level and Functional Ability  Outcome: Ongoing (interventions implemented as appropriate)         Home

## 2019-09-03 NOTE — DISCHARGE NOTE ADULT - NS AS DC STROKE ED MATERIALS
Stroke Warning Signs and Symptoms/Call 911 for Stroke/Risk Factors for Stroke/Stroke Education Booklet/Prescribed Medications/Need for Followup After Discharge Never smoker

## 2019-10-10 PROBLEM — J45.909 UNSPECIFIED ASTHMA, UNCOMPLICATED: Chronic | Status: ACTIVE | Noted: 2018-12-14

## 2020-03-19 NOTE — ED ADULT NURSE NOTE - NSSISCREENINGQ4_ED_A_ED
RN and patient discussed guidelines in accordance with CDC regarding COVID 19.    Patient wanting a book to be able to log BS/exercise. RN and patient discussed high sugar/high carb examples.   Patient went for a walk Saturday and her hip started bothering her.  Patient received an injection in the left hip on 3/11/20. She reports and improvement in symptoms in her back and hip at times. RN educated patient on osteoarthritis and how immobility can lead to discomfort. Patient has been making an effort to take garbage and recyclables out at least a few times per week, so it is manageable to her to carry and to help increase PA.     Patient had an apt with Dr. Goodrich  scheduled for yesterday, which has been cancelled due to COVID 19 precautions. RN suggested phone visit for patient, especially if she is feeling down, patient reports \"is doing good and it can wait.\"     RN and patient spent an extensive time discussing diet, patient educated on what suffices as a protein. RN and patient discussed portion sizes.       RN reinforced doing PT exercise. RN had patient teach back how to do all 5 exercise. Patient has been doing them once per day at 10 reps. RN and patient set goal of doing exercises 2 times per day.     Patient stomach has been bothering her today, acknowledges that she drank milk yesterday ( she is lactose intolerant). Patient remarked maybe I just need to drink less, RN reinforced that with intolerance you should really try to avoid altogether, Patient has purchased almond milk in the past, and has had no problem with it.     PLAN:  Continue to work on increasing PA          
No

## 2020-11-17 NOTE — PATIENT PROFILE ADULT - PATIENT REPRESENTATIVE: ( YOU CAN CHOOSE ANY PERSON THAT CAN ASSIST YOU WITH YOUR HEALTH CARE PREFERENCES, DOES NOT HAVE TO BE A SPOUSE, IMMEDIATE FAMILY OR SIGNIFICANT OTHER/PARTNER)
yes [Allergy Testing Dust Mite] : dust mites [Allergy Testing Mixed Feathers] : feathers [Allergy Testing Cockroach] : cockroach [Allergy Testing Dog] : dog [Allergy Testing Cat] : cat [] : molds [Allergy Testing Trees] : trees [Allergy Testing Weeds] : weeds [Allergy Testing Grasses] : grasses

## 2021-05-28 NOTE — ED PROVIDER NOTE - NEUROLOGICAL, MLM
Patient: EDMUND MOJICA     Acct: [id number]     Report: #[id number]  UNIT #: [id number]     : 1948    Encounter Date:01/10/2020  PRIMARY CARE: MATTHEW PETERSON  ***Signed***  --------------------------------------------------------------------------------------------------------------------  Chief Complaint      Encounter Date      Jose 10, 2020            Primary Care Provider      REINA PETERSON            Referring Provider      REINA PETERSON            Patient Complaint      Patient is complaining of      Pt here for f/u, copd            VITALS      Height 5 ft 1 in / 154.94 cm      Weight 210 lbs 4 oz / 95.931203 kg      BSA 1.93 m2      BMI 39.7 kg/m2      Temperature 98.1 F / 36.72 C - Oral      Pulse 67      Respirations 18      Blood Pressure 121/69 Sitting, Left Arm      Pulse Oximetry 97%, Room air            HPI      The patient is a very pleasant 71 year old female here today for follow up.             She is overall doing well at this time, she has no complaints of  shortness of     breath over her baseline. She does complain of some left ear pain but no fever     or chills. She reports compliance with the use of her bronchodilator therapies     and feel they are helping with her symptoms.            ROS      Constitutional:  Denies: Fatigue, Fever, Weight gain, Weight loss, Chills,     Insomnia, Other      Respiratory/Breathing:  Complains of: Shortness of air, Wheezing, Cough; Denies:    Hemoptysis, Pleuritic pain, Other      Endocrine:  Denies: Polydipsia, Polyuria, Heat/cold intolerance, Abnorml     menstrual pattern, Diabetes, Other      Eyes:  Denies: Blurred vision, Vision Changes, Other      Ears, nose, mouth, throat:  Denies: Congestion, Dysphagia, Hearing Changes, Nose    Bleeding, Nasal Discharge, Throat pain, Tinnitus, Other      Cardiovascular:  Denies: Chest Pain, Exertional dyspnea, Peripheral Edema,     Palpitations, Syncope, Wake up Gasping for air, Orthopnea,  Tachycardia, Other      Gastrointestinal:  Denies: Abdominal pain/cramping, Bloody stools, Constipation,    Diarrhea, Melena, Nausea, Vomiting, Other      Genitourinary:  Denies: Dysuria, Urinary frequency, Incontinence, Hematuria,     Urgency, Other      Musculoskeletal:  Denies: Joint Pain, Joint Stiffness, Joint Swelling, Myalgias,    Other      Hematologic/lymphatic:  DENIES: Lymphadenopathy, Bruising, Bleeding tendencies,     Other      Neurologic:  Denies: Headache, Numbness, Weakness, Seizures, Other      Psychiatric:  Denies: Anxiety, Appropriate Effect, Depression, Other      Sleep:  No: Excessive daytime sleep, Morning Headache?, Snoring, Insomnia?, Stop    breathing at sleep?, Other      Integumentary:  Denies: Rash, Dry skin, Skin Warm to Touch, Other            FAMILY/SOCIAL/MEDICAL HX      Surgical History:  Yes: Appendectomy, Cholecystectomy, Orthopedic Surgery (Right    knee); No: AAA Repair, Abdominal Surgery, Adenoids, Angioplasty, Back Surgery,     Bladder Surgery, Bowel Surgery, Breast Surgery, CABG, Carotid Stenosis, Ear     Surgery, Eye Surgery, Head Surgery, Hernia Surgery, Kidney Surgery, Nose     Surgery, Oral Surgery, Prostatectomy, Rectal Surgery, Spinal Surgery, Testicular    Surgery, Throat Surgery, Tonsils, Valve Replacement, Vascular Surgery, Other     Surgeries      Heart - Family Hx:  Father      Cancer/Type - Family Hx:  Mother, Father, Brother, Sister      Other Family Medical History:  Mother, Father      Is Father Still Living?:  No      Is Mother Still Living?:  No       Family History:  Yes      Social History:  No Tobacco Use, No Alcohol Use, No Recreational Drug use      Smoking status:  Former smoker (smoked a little, quit 47 years ago)      Anticoagulation Therapy:  Yes      Antibiotic Prophylaxis:  No      Medical History:  Yes: Allergies, Arthritis, Asthma, Chronic Bronchitis/COPD,     Diabetes, Heart Attack, High Blood Pressure, High Cholesterol, Reflux Disease,      Thyroid Problem, Miscellaneous Medical/oth (afib); No: Alcoholism, Anemia,     Atrial Fibrillation, Blood Disease, Broken Bones, Cataracts, Chemical     Dependency, Chemotherapy/Cancer, Emphysema, Chronic Liver Disease, Colon     Trouble, Colitis, Diverticulitis, Congestive Heart Failu, Deafness or Ringing     Ears, Convulsions, Depression, Anxiety, Bipolar Disorder, PTSD, Epilepsy, Sei    zures, Forgetfullness, Glaucoma, Gall Stones, Gout, Head Injury, Heart Murmur,     GERD, Hemorrhoids/Rectal Prob, Hepatitis, Hiatal Hernia, HIV (Do not ask - volu,    Jaundice, Kidney or Bladder Disease, Kidney Stones, Migrane Headaches, Mitral     Valve Prolapse, Night sweats, Phlebitis, Psychiatric Care, Rheumatic Fever,     Sexually Transmitted Dis, Shortness Of Breath, Sinus Trouble, Skin Dis    ease/Psoriais/Ecz, Stroke, Tuberculosis or Pos TB Te      Psychiatric History      None            PREVENTION      Hx Influenza Vaccination:  Yes      Date Influenza Vaccine Given:  Oct 1, 2019      Influenza Vaccine Declined:  No      2 or More Falls Past Year?:  No      Fall Past Year with Injury?:  No      Hx Pneumococcal Vaccination:  Yes      Encouraged to follow-up with:  PCP regarding preventative exams.      Chart initiated by      Renetta Haro MA            ALLERGIES/MEDICATIONS      Allergies:        Coded Allergies:             CEPHALEXIN (Verified  Allergy, Intermediate, vomiting, 1/10/20)           CHLORPHENIRAMINE (Verified  Allergy, Intermediate, 1/10/20)           CODEINE (Verified  Allergy, Intermediate, swelling, 1/10/20)           HYDROCODONE (Verified  Allergy, Intermediate, 1/10/20)           NALBUPHINE (Verified  Allergy, Intermediate, breaks out, 1/10/20)           PENICILLINS (Verified  Allergy, Intermediate, swellling, 1/10/20)      Medications    Last Reconciled on 1/10/20 10:38 by KETURAH SEQUEIRA MD      Arformoterol Tartrate (Brovana) 15 Mcg/2 Ml Vial.neb      15 MCG INH RTBID, #60 NEB 11 Refills          Prov: Dagmar Rm PA-C         7/19/19       Montelukast Sodium (Singulair*) 10 Mg Tab      10 MG PO HS, #30 TAB 11 Refills         Prov: Dagmar Rm PA-C         7/19/19       Neb-Budesonide (Pulmicort) 0.5 Mg/2 Ml Ampul.neb      0.5 MG INH RTBID, #60 NEB 3 Refills         Prov: YunJacky         5/24/19       Aspirin Chew (Aspirin Chew) 81 Mg Tab.chew      81 MG PO QDAY, #30 TAB.CHEW 0 Refills         Reported         9/27/18       Glycopyrrol/Nebulizer/Accessor (Lonhala Magnair 25 Mcg Starter) 25 Mcg/1 Ml     Vial.neb      MCG INH RTBID, #60         Reported         7/25/18       Cyanocobalamin (Vitamin B-12*) 1,000 Mcg Tablet.er      1000 MCG PO QDAY, #30 TAB.ER         Reported         7/25/18       Apixaban (Eliquis) 5 Mg Tablet      5 MG PO BID for 30 Days, #60 TAB         Reported         7/25/18       Albuterol/Ipratropium (Duoneb) 3 Ml Ampul.neb      3 ML INH Q4H PRN for SHORTNESS OF BREATH, #120 NEB 0 Refills         Reported         7/25/18       Liraglutide (Victoza 2-SAM) 0.1 Ml Cartridge      18 MG SQ QDAY, EA         Reported         7/25/18       Ezetimide (Zetia) 10 Mg Tablet      10 MG PO HS, #30 TAB 0 Refills         Reported         7/25/18       raNITIdine HCL (raNITIdine HCL) 150 Mg Tablet      150 MG PO HS for 30 Days, #30 TAB         Reported         7/25/18       Nitroglycerin (Nitrostat*) 0.4 Mg Tablet      0.4 MG SL ASDIR, #25 TAB 1 Refill         Reported         7/25/18       Metoprolol Succinate (Metoprolol Succinate) 50 Mg Tab.er.24h      25 MG PO BID, #30 TAB.SR.24H 0 Refills         Reported         7/25/18       Loratadine (Loratadine) 10 Mg Tablet      10 MG PO QDAY, #30 TAB 0 Refills         Reported         7/25/18       Levothyroxine Sodium (Levoxyl*) 0.05 Mg Tablet      0.05 MG PO QDAY@07, #30 TAB 0 Refills         Reported         7/25/18       Gabapentin (Gabapentin) 400 Mg Capsule      400 MG PO TID, #90 CAP 0 Refills         Reported         7/25/18        Hunter-Fluticasone (Fluticasone 50 mcg) 16 Gm Spray.susp      2 PUFFS NARE EACH QDAY, #1 BOTTLE 0 Refills         Reported         7/25/18       Atorvastatin (Atorvastatin) 20 Mg Tablet      20 MG PO HS, #30 TAB 0 Refills         Reported         7/25/18      Current Medications      Current Medications Reviewed 1/10/20            EXAM      GEN-patient appears stated age resting comfortable in no acute distress      Eyes-PERRL,  conjunctiva are normal in appearance extraocular muscles are     intact, no scleral icterus      Nasal-both nares are patent turbinates appear normal no polyps seen no nasal     discharge or ulcerations      Ears-tympanic membranes are normal no erythema no bulging, normal to inspection      Lymphatic-no swollen or enlarged cervical nodes, or axillary node, or femoral     nodes, or supraclavicular nodes      Mouth normal dentition, no erythema no ulcerations oropharynx appears normal no     exudate no evidence of postnasal drip, MP(2)      Neck-there are no palpable supraclavicular or cervical adenopathy, thyroid is     normal in appearance no apparent nodules, there is no inspiratory or expiratory     stridor      Respiratory-patient has increased AP diameter, exhibits normal work of     breathing, speaking in full sentences without difficulty, the chest is normal in     appearance, clear to auscultation with no wheezes rales or rhonchi, chest is     normal to percussion on both the right and left sides      Cardiovascular-the heart rate is normal and regular S1 and S2 present with no     murmur or extra heart sounds, there is no JVD or pedal edema present      GI-the abdomen is normal in appearance, bowel sounds present and normal in all     quadrants no hepatosplenomegaly or masses felt      Extremities-no clubbing is present, pulses present in all extremities, capillary     refill time is normal      Musculoskeletal-Normal strength in upper and lower extremities, inspection shows     no  evidence of muscle atrophy      Skin-skin is normal in appearance it is warm and dry, no rashes present, no     evidence of cyanosis, palpation reveals no masses      Neurological-the patient is alert and oriented to time place and person, moves     all 4 extremities, normal gait, normal affect and mood, CN2-12 intact      Psych-normal judgment and insight is good, normal mood and affect, alert and     oriented to person, place, and time, and date      Vitals      Vitals:             Height 5 ft 1 in / 154.94 cm           Weight 210 lbs 4 oz / 95.892695 kg           BSA 1.93 m2           BMI 39.7 kg/m2           Temperature 98.1 F / 36.72 C - Oral           Pulse 67           Respirations 18           Blood Pressure 121/69 Sitting, Left Arm           Pulse Oximetry 97%, Room air            REVIEW      Results Reviewed      PCCS Results Reviewed?:  Yes Prev Lab Results, Yes Prev Radiology Results, Yes     Previous [facility] Records            Assessment      ASSESSMENT:       1. Mild intermittent asthma without acute exacerbation.      2. COPD without acute exacerbation.        3. Hypogammaglobulinemia.            PLAN:      1. Continue current with regimen with Brovana and Pulmicort bid, Lonhala nebs     twice a day and Singulair.      2.  Up-to-date on Fluzone vaccine and Pneumovax.      3. I have personally reviewed laboratory data, imaging and previous medical     records.            Patient Education      Education resources provided:  Yes      Patient Education Provided:  Acute Asthma            Electronically signed by Jacky Malcolm  01/23/2020 11:57       Disclaimer: Converted document may not contain table formatting or lab diagrams. Please see [employer] for the authenticated document.   Alert and oriented, no focal deficits, no motor or sensory deficits. Normal finger to nose, no nystagmus

## 2021-07-09 NOTE — PROGRESS NOTE ADULT - PROVIDER SPECIALTY LIST ADULT
Cardiology
Internal Medicine
[place] [facility] [place] s/p fall down 3 cement steps. pt arrives in c collar, c/o b/l hand pain numbness and tingling.  hx of carpal tunnel. bruising and swelling to left side of face and lacerations.  KIM, upper extremeties with increase pain.
Internal Medicine
Internal Medicine

## 2022-09-19 NOTE — ED PROVIDER NOTE - ENMT, MLM
How Many Mls Were Removed From The 10 Mg/Ml (5ml) Vial When Preparing The Injectable Solution?: 0 Airway patent, Nasal mucosa clear. Mouth with normal mucosa. Throat has no vesicles, no oropharyngeal exudates and uvula is midline.

## 2023-03-26 ENCOUNTER — EMERGENCY (EMERGENCY)
Facility: HOSPITAL | Age: 38
LOS: 1 days | Discharge: HOME | End: 2023-03-26
Attending: EMERGENCY MEDICINE | Admitting: EMERGENCY MEDICINE
Payer: MEDICAID

## 2023-03-26 VITALS
RESPIRATION RATE: 16 BRPM | DIASTOLIC BLOOD PRESSURE: 81 MMHG | HEART RATE: 67 BPM | TEMPERATURE: 98 F | SYSTOLIC BLOOD PRESSURE: 129 MMHG | OXYGEN SATURATION: 100 %

## 2023-03-26 VITALS
RESPIRATION RATE: 18 BRPM | OXYGEN SATURATION: 100 % | DIASTOLIC BLOOD PRESSURE: 92 MMHG | SYSTOLIC BLOOD PRESSURE: 139 MMHG | HEART RATE: 92 BPM | TEMPERATURE: 98 F

## 2023-03-26 DIAGNOSIS — Z90.49 ACQUIRED ABSENCE OF OTHER SPECIFIED PARTS OF DIGESTIVE TRACT: Chronic | ICD-10-CM

## 2023-03-26 DIAGNOSIS — Z98.890 OTHER SPECIFIED POSTPROCEDURAL STATES: Chronic | ICD-10-CM

## 2023-03-26 DIAGNOSIS — Z90.79 ACQUIRED ABSENCE OF OTHER GENITAL ORGAN(S): Chronic | ICD-10-CM

## 2023-03-26 DIAGNOSIS — N80.3 ENDOMETRIOSIS OF PELVIC PERITONEUM: Chronic | ICD-10-CM

## 2023-03-26 LAB
ALBUMIN SERPL ELPH-MCNC: 4.4 G/DL — SIGNIFICANT CHANGE UP (ref 3.3–5)
ALP SERPL-CCNC: 82 U/L — SIGNIFICANT CHANGE UP (ref 40–120)
ALT FLD-CCNC: 10 U/L — SIGNIFICANT CHANGE UP (ref 4–33)
ANION GAP SERPL CALC-SCNC: 11 MMOL/L — SIGNIFICANT CHANGE UP (ref 7–14)
APPEARANCE UR: CLEAR — SIGNIFICANT CHANGE UP
AST SERPL-CCNC: 17 U/L — SIGNIFICANT CHANGE UP (ref 4–32)
BACTERIA # UR AUTO: NEGATIVE — SIGNIFICANT CHANGE UP
BASOPHILS # BLD AUTO: 0.05 K/UL — SIGNIFICANT CHANGE UP (ref 0–0.2)
BASOPHILS NFR BLD AUTO: 0.9 % — SIGNIFICANT CHANGE UP (ref 0–2)
BILIRUB SERPL-MCNC: 0.5 MG/DL — SIGNIFICANT CHANGE UP (ref 0.2–1.2)
BILIRUB UR-MCNC: NEGATIVE — SIGNIFICANT CHANGE UP
BLD GP AB SCN SERPL QL: NEGATIVE — SIGNIFICANT CHANGE UP
BUN SERPL-MCNC: 7 MG/DL — SIGNIFICANT CHANGE UP (ref 7–23)
CALCIUM SERPL-MCNC: 8.5 MG/DL — SIGNIFICANT CHANGE UP (ref 8.4–10.5)
CHLORIDE SERPL-SCNC: 103 MMOL/L — SIGNIFICANT CHANGE UP (ref 98–107)
CO2 SERPL-SCNC: 24 MMOL/L — SIGNIFICANT CHANGE UP (ref 22–31)
COLOR SPEC: SIGNIFICANT CHANGE UP
CREAT SERPL-MCNC: 0.76 MG/DL — SIGNIFICANT CHANGE UP (ref 0.5–1.3)
DIFF PNL FLD: ABNORMAL
EGFR: 105 ML/MIN/1.73M2 — SIGNIFICANT CHANGE UP
EOSINOPHIL # BLD AUTO: 0.28 K/UL — SIGNIFICANT CHANGE UP (ref 0–0.5)
EOSINOPHIL NFR BLD AUTO: 5 % — SIGNIFICANT CHANGE UP (ref 0–6)
EPI CELLS # UR: 3 /HPF — SIGNIFICANT CHANGE UP (ref 0–5)
GLUCOSE SERPL-MCNC: 90 MG/DL — SIGNIFICANT CHANGE UP (ref 70–99)
GLUCOSE UR QL: NEGATIVE — SIGNIFICANT CHANGE UP
HCG SERPL-ACNC: <5 MIU/ML — SIGNIFICANT CHANGE UP
HCT VFR BLD CALC: 41.2 % — SIGNIFICANT CHANGE UP (ref 34.5–45)
HGB BLD-MCNC: 13.5 G/DL — SIGNIFICANT CHANGE UP (ref 11.5–15.5)
HYALINE CASTS # UR AUTO: 0 /LPF — SIGNIFICANT CHANGE UP (ref 0–7)
IANC: 2.99 K/UL — SIGNIFICANT CHANGE UP (ref 1.8–7.4)
IMM GRANULOCYTES NFR BLD AUTO: 0.4 % — SIGNIFICANT CHANGE UP (ref 0–0.9)
KETONES UR-MCNC: NEGATIVE — SIGNIFICANT CHANGE UP
LEUKOCYTE ESTERASE UR-ACNC: NEGATIVE — SIGNIFICANT CHANGE UP
LYMPHOCYTES # BLD AUTO: 1.82 K/UL — SIGNIFICANT CHANGE UP (ref 1–3.3)
LYMPHOCYTES # BLD AUTO: 32.3 % — SIGNIFICANT CHANGE UP (ref 13–44)
MCHC RBC-ENTMCNC: 30.9 PG — SIGNIFICANT CHANGE UP (ref 27–34)
MCHC RBC-ENTMCNC: 32.8 GM/DL — SIGNIFICANT CHANGE UP (ref 32–36)
MCV RBC AUTO: 94.3 FL — SIGNIFICANT CHANGE UP (ref 80–100)
MONOCYTES # BLD AUTO: 0.47 K/UL — SIGNIFICANT CHANGE UP (ref 0–0.9)
MONOCYTES NFR BLD AUTO: 8.3 % — SIGNIFICANT CHANGE UP (ref 2–14)
NEUTROPHILS # BLD AUTO: 2.99 K/UL — SIGNIFICANT CHANGE UP (ref 1.8–7.4)
NEUTROPHILS NFR BLD AUTO: 53.1 % — SIGNIFICANT CHANGE UP (ref 43–77)
NITRITE UR-MCNC: NEGATIVE — SIGNIFICANT CHANGE UP
NRBC # BLD: 0 /100 WBCS — SIGNIFICANT CHANGE UP (ref 0–0)
NRBC # FLD: 0 K/UL — SIGNIFICANT CHANGE UP (ref 0–0)
PH UR: 6.5 — SIGNIFICANT CHANGE UP (ref 5–8)
PLATELET # BLD AUTO: 344 K/UL — SIGNIFICANT CHANGE UP (ref 150–400)
POTASSIUM SERPL-MCNC: 4.1 MMOL/L — SIGNIFICANT CHANGE UP (ref 3.5–5.3)
POTASSIUM SERPL-SCNC: 4.1 MMOL/L — SIGNIFICANT CHANGE UP (ref 3.5–5.3)
PROT SERPL-MCNC: 7.4 G/DL — SIGNIFICANT CHANGE UP (ref 6–8.3)
PROT UR-MCNC: NEGATIVE — SIGNIFICANT CHANGE UP
RBC # BLD: 4.37 M/UL — SIGNIFICANT CHANGE UP (ref 3.8–5.2)
RBC # FLD: 13.1 % — SIGNIFICANT CHANGE UP (ref 10.3–14.5)
RBC CASTS # UR COMP ASSIST: 2 /HPF — SIGNIFICANT CHANGE UP (ref 0–4)
RH IG SCN BLD-IMP: NEGATIVE — SIGNIFICANT CHANGE UP
SODIUM SERPL-SCNC: 138 MMOL/L — SIGNIFICANT CHANGE UP (ref 135–145)
SP GR SPEC: 1.01 — SIGNIFICANT CHANGE UP (ref 1.01–1.05)
UROBILINOGEN FLD QL: SIGNIFICANT CHANGE UP
WBC # BLD: 5.63 K/UL — SIGNIFICANT CHANGE UP (ref 3.8–10.5)
WBC # FLD AUTO: 5.63 K/UL — SIGNIFICANT CHANGE UP (ref 3.8–10.5)
WBC UR QL: 1 /HPF — SIGNIFICANT CHANGE UP (ref 0–5)

## 2023-03-26 PROCEDURE — 74177 CT ABD & PELVIS W/CONTRAST: CPT | Mod: 26,MA

## 2023-03-26 PROCEDURE — 76830 TRANSVAGINAL US NON-OB: CPT | Mod: 26

## 2023-03-26 PROCEDURE — 99285 EMERGENCY DEPT VISIT HI MDM: CPT

## 2023-03-26 RX ORDER — METOCLOPRAMIDE HCL 10 MG
10 TABLET ORAL ONCE
Refills: 0 | Status: COMPLETED | OUTPATIENT
Start: 2023-03-26 | End: 2023-03-26

## 2023-03-26 RX ORDER — MORPHINE SULFATE 50 MG/1
4 CAPSULE, EXTENDED RELEASE ORAL ONCE
Refills: 0 | Status: DISCONTINUED | OUTPATIENT
Start: 2023-03-26 | End: 2023-03-26

## 2023-03-26 RX ORDER — SODIUM CHLORIDE 9 MG/ML
1000 INJECTION, SOLUTION INTRAVENOUS ONCE
Refills: 0 | Status: COMPLETED | OUTPATIENT
Start: 2023-03-26 | End: 2023-03-26

## 2023-03-26 RX ORDER — ACETAMINOPHEN 500 MG
1000 TABLET ORAL ONCE
Refills: 0 | Status: COMPLETED | OUTPATIENT
Start: 2023-03-26 | End: 2023-03-26

## 2023-03-26 RX ADMIN — Medication 1000 MILLIGRAM(S): at 06:00

## 2023-03-26 RX ADMIN — SODIUM CHLORIDE 1000 MILLILITER(S): 9 INJECTION, SOLUTION INTRAVENOUS at 05:46

## 2023-03-26 RX ADMIN — MORPHINE SULFATE 4 MILLIGRAM(S): 50 CAPSULE, EXTENDED RELEASE ORAL at 07:23

## 2023-03-26 RX ADMIN — Medication 1000 MILLIGRAM(S): at 06:15

## 2023-03-26 RX ADMIN — MORPHINE SULFATE 4 MILLIGRAM(S): 50 CAPSULE, EXTENDED RELEASE ORAL at 08:10

## 2023-03-26 RX ADMIN — Medication 10 MILLIGRAM(S): at 05:46

## 2023-03-26 RX ADMIN — Medication 400 MILLIGRAM(S): at 05:45

## 2023-03-26 NOTE — ED PROVIDER NOTE - PATIENT PORTAL LINK FT
You can access the FollowMyHealth Patient Portal offered by [facility] by registering at the following website: [email or web address]. By joining [employer]’s FollowMyHealth portal, you will also be able to view your health information using other applications (apps) compatible with our system.

## 2023-03-26 NOTE — ED ADULT NURSE NOTE - OBJECTIVE STATEMENT
Received the patient in room 23 with c/o abdominal pain started few days ago and vaginal bleeding started last night. Patient states she had her menstruation 2 weeks ago. Not in acute distress. alert and orientedx 4, ambulates in steady gait. 20G IV line inserted in right AC. due labs sent. Meds given as per order. Will continue to monitor.

## 2023-03-26 NOTE — ED PROVIDER NOTE - NSFOLLOWUPINSTRUCTIONS_ED_ALL_ED_FT
It was a pleasure caring for you today. As disused, you will need to follow up with OBGYN for further workup if your symptoms persist. The hospital will call you to help you make an appointment.   You may take 500-1000 mg acetaminophen every 6 hours, as needed for pain  You may take 600 mg ibuprofen every 8 hours, with food, as needed for pain  Please see your primary doctor in 2-3 days for follow-up care. Return to ER for any new or worsening symptoms including worsening pain, nausea and vomiting or new fevers with your pain.

## 2023-03-26 NOTE — ED PROVIDER NOTE - OBJECTIVE STATEMENT
37-year-old female history of endometriosis, asthma, history of hernia repair as a child presenting with abdominal pain and vaginal bleeding.  Patient endorses diffuse lower abdominal pain.  States vaginal bleeding started earlier tonight.  States she has been using up to 2 tampons an hour.  States last menstrual period was 2 weeks ago however it was heavy and lasted 10 days.  States she is sexually active states concerns for pregnancy.  Denies any fevers, chills, chest pain, shortness of breath, nausea vomiting diarrhea.  States that her endometriosis improved after she had her first child.  Pregnancy history is G1, P1.

## 2023-03-26 NOTE — ED PROVIDER NOTE - PROGRESS NOTE DETAILS
Jarett Tee, PGY2 this pt was signed out to me. ct shows no aucte findings. pain has resolved since being in ED> will dc with gyn f/iu

## 2023-03-26 NOTE — ED PROVIDER NOTE - ATTENDING CONTRIBUTION TO CARE
HPI: 37-year-old female history of endometriosis, asthma, history of hernia repair as a child presenting with abdominal pain and vaginal bleeding.  Patient endorses diffuse lower abdominal pain.  States vaginal bleeding started earlier tonight.  States she has been using up to 2 tampons an hour.  States last menstrual period was 2 weeks ago however it was heavy and lasted 10 days.  States she is sexually active states concerns for pregnancy.  Denies any fevers, chills, chest pain, shortness of breath, nausea vomiting diarrhea.  States that her endometriosis improved after she had her first child.  Pregnancy history is G1, P1.  EXAM: uncomfortable appearing, holding lower pelvis. tenderness to palpation suprpubic without masses, rebound or guarding, no CVAT, skin without changes/trauma. Neg mcburneys, neg murphys.   MDM: Pt with multiple medical problems that presents with pelvic pain and vaginal bleeding, LMP 2 wks ago and is going through multiple tampons per day. Concern for possible menorrhagia vs miscarriage vs ectopic vs torsion. Will require labs, imaging and provide pain control and support. Will reassess when work up complete in ED. HPI: 37-year-old female history of endometriosis, asthma, history of hernia repair as a child presenting with abdominal pain and vaginal bleeding.  Patient endorses diffuse lower abdominal pain.  States vaginal bleeding started earlier tonight.  States she has been using up to 2 tampons an hour.  States last menstrual period was 2 weeks ago however it was heavy and lasted 10 days.  States she is sexually active states concerns for pregnancy.  Denies any fevers, chills, chest pain, shortness of breath, nausea vomiting diarrhea.  States that her endometriosis improved after she had her first child.  Pregnancy history is G1, P1.  EXAM: uncomfortable appearing, holding lower pelvis. tenderness to palpation suprpubic without masses, rebound or guarding, no CVAT, skin without changes/trauma. Neg mcburneys, neg murphys.   MDM: Pt with multiple medical problems that presents with pelvic pain and vaginal bleeding, LMP 2 wks ago and is going through multiple tampons per day. Concern for most likely flare of endometriosis which we know pt has vs menorrhagia vs miscarriage vs ectopic vs torsion. Will require labs, imaging and provide pain control and support. Will reassess when work up complete in ED.

## 2023-03-26 NOTE — ED PROVIDER NOTE - NS ED ROS FT
GENERAL: No fever or chills  EYES: No change in vision  HEENT: No trouble swallowing or speaking  CARDIAC: No chest pain  PULMONARY: No cough or SOB  GI: +abdominal pain, no nausea or no vomiting, no diarrhea or constipation  : +vaginal bleeding   SKIN: No rashes  NEURO: No headache, no numbness  MSK: No joint pain  Otherwise as HPI or negative.

## 2023-03-26 NOTE — ED PROVIDER NOTE - NSICDXPASTMEDICALHX_GEN_ALL_CORE_FT
PAST MEDICAL HISTORY:  Asthma No hospitalization or intubation, on PRN Albuterol, last attack in last summer    Endometriosis

## 2023-03-26 NOTE — ED PROVIDER NOTE - CLINICAL SUMMARY MEDICAL DECISION MAKING FREE TEXT BOX
37-year-old female history of endometriosis, left partial oophorectomy, asthma, history of hernia repair as a child presenting with abdominal pain and vaginal bleeding.  Patient endorses diffuse lower abdominal pain with some radiation to left flank.  States vaginal bleeding started earlier tonight.  States she has been using up to 2 tampons an hour.  States last menstrual period was 2 weeks ago however it was heavy and lasted 10 days.  States she is sexually active states concerns for pregnancy.  Denies any fevers, chills, chest pain, shortness of breath, nausea vomiting diarrhea.  States that her endometriosis improved after she had her first child.  Pregnancy history is G1, P1.    Patient hemodynamically stable, afebrile  will order cbc, cmp, hcg, type and screen, TVUS. Symptomatic control with fluids, reglan, acetaminophen  ddx includes but not limited to ectopic vs. endometriosis vs. ovarian torsion vs. kidney stone

## 2023-03-26 NOTE — ED ADULT TRIAGE NOTE - CHIEF COMPLAINT QUOTE
abd pain and vag bleed    states having mid abd pain rad to left side and hip a few days ago.  had period 2 weeks ago.. stats was much heavier and lasted 10 days which is not usual for her.  starting having vaginal bleeding again yesterday.. using 2 tampons and hour.  denies any possibility of being pregnant.  feels weak.  pmhx- endometriosis

## 2023-03-26 NOTE — ED ADULT NURSE REASSESSMENT NOTE - NS ED NURSE REASSESS COMMENT FT1
Pt received in room.  Pt reports some pain relief.  Pt lungs clear, abdomen mildly tender during palpation.  Pt skin intact.  Pt awaiting CT scan.  pt with support person at bedside.  Will continue to monitor.

## 2023-03-26 NOTE — ED PROVIDER NOTE - NSICDXPASTSURGICALHX_GEN_ALL_CORE_FT
PAST SURGICAL HISTORY:  Endometriosis of pelvis     H/O ovarian cystectomy     History of appendectomy     History of salpingectomy Right side

## 2023-03-27 LAB
CULTURE RESULTS: SIGNIFICANT CHANGE UP
SPECIMEN SOURCE: SIGNIFICANT CHANGE UP

## 2023-03-29 NOTE — ED POST DISCHARGE NOTE - RESULT SUMMARY
UCX: Streptococcus agalactiae Gp B 50,000-99,000CFU/ml No antibiotic listed in ED provider note or prescription writer at time of discharge. Patient contact # [phone]. Patient denies any dysuria or fever. Discussed with patient to follow up with MD for repeat UA/UCX. Discussed with patient need to return to ED if symptoms don't continue to improve or recur or develops any new or worsening symptoms that are of concern.

## 2023-12-02 NOTE — ED ADULT NURSE REASSESSMENT NOTE - NS ED NURSE REASSESS COMMENT FT1
Resting on stretcher. not in acute distress. alert and oriented x 4, safety maintained. VSS. Await CT 61yo F w/ hx htn, hypothyroidism presenting with cp. Pt started having R shoulder pain radiating to R chest, occasionally down her R arm today at 8pm. CP is pleuritic. Now much improved but still present. Pt had similar pain 1w ago associated with low grade fever, body aches for 1d. No fever today. No sob, abd pain, n/v, leg swelling/pain. No hx blood clot, ocp use or CA. Had recent L breast lumpectomy for calcification, not cancerous. Surgery was ambulatory, no immobilization. Reports has cough with sputum in mornings. Exam shows well appearing female, no reproducible chest wall ttp, no pain with rom. Low suspicion PE given no risk factors, however will get dimer given pleuritic cp. Will get labs, trop to eval acs.